# Patient Record
Sex: FEMALE | Race: BLACK OR AFRICAN AMERICAN | NOT HISPANIC OR LATINO
[De-identification: names, ages, dates, MRNs, and addresses within clinical notes are randomized per-mention and may not be internally consistent; named-entity substitution may affect disease eponyms.]

---

## 2017-09-26 ENCOUNTER — RESULT REVIEW (OUTPATIENT)
Age: 42
End: 2017-09-26

## 2017-10-05 ENCOUNTER — APPOINTMENT (OUTPATIENT)
Dept: UROLOGY | Facility: CLINIC | Age: 42
End: 2017-10-05
Payer: COMMERCIAL

## 2017-10-05 VITALS
HEIGHT: 63 IN | SYSTOLIC BLOOD PRESSURE: 110 MMHG | TEMPERATURE: 98.3 F | DIASTOLIC BLOOD PRESSURE: 80 MMHG | OXYGEN SATURATION: 95 % | HEART RATE: 78 BPM | WEIGHT: 157 LBS | BODY MASS INDEX: 27.82 KG/M2

## 2017-10-05 DIAGNOSIS — Z78.9 OTHER SPECIFIED HEALTH STATUS: ICD-10-CM

## 2017-10-05 PROCEDURE — 99203 OFFICE O/P NEW LOW 30 MIN: CPT

## 2017-10-05 RX ORDER — CABERGOLINE 0.5 MG/1
0.5 TABLET ORAL
Refills: 0 | Status: ACTIVE | COMMUNITY

## 2017-10-06 LAB
APPEARANCE: CLEAR
BACTERIA: NEGATIVE
BILIRUBIN URINE: NEGATIVE
BLOOD URINE: NEGATIVE
COLOR: ABNORMAL
CORE LAB FLUID CYTOLOGY: NORMAL
GLUCOSE QUALITATIVE U: NEGATIVE MG/DL
HYALINE CASTS: 2 /LPF
KETONES URINE: ABNORMAL
LEUKOCYTE ESTERASE URINE: NEGATIVE
MICROSCOPIC-UA: NORMAL
NITRITE URINE: NEGATIVE
PH URINE: 5.5
PROTEIN URINE: NEGATIVE MG/DL
RED BLOOD CELLS URINE: 2 /HPF
SPECIFIC GRAVITY URINE: 1.04
SQUAMOUS EPITHELIAL CELLS: 13 /HPF
UROBILINOGEN URINE: NEGATIVE MG/DL
WHITE BLOOD CELLS URINE: 2 /HPF

## 2017-10-09 LAB — BACTERIA UR CULT: NORMAL

## 2017-10-16 ENCOUNTER — APPOINTMENT (OUTPATIENT)
Dept: UROLOGY | Facility: CLINIC | Age: 42
End: 2017-10-16
Payer: COMMERCIAL

## 2017-10-23 ENCOUNTER — APPOINTMENT (OUTPATIENT)
Dept: UROLOGY | Facility: CLINIC | Age: 42
End: 2017-10-23
Payer: COMMERCIAL

## 2017-10-23 VITALS
SYSTOLIC BLOOD PRESSURE: 122 MMHG | DIASTOLIC BLOOD PRESSURE: 78 MMHG | TEMPERATURE: 98.2 F | OXYGEN SATURATION: 98 % | HEART RATE: 60 BPM

## 2017-10-23 DIAGNOSIS — R31.29 OTHER MICROSCOPIC HEMATURIA: ICD-10-CM

## 2017-10-23 PROCEDURE — 52000 CYSTOURETHROSCOPY: CPT

## 2018-07-25 PROBLEM — Z78.9 ALCOHOL USE: Status: ACTIVE | Noted: 2017-10-05

## 2019-02-20 ENCOUNTER — RESULT REVIEW (OUTPATIENT)
Age: 44
End: 2019-02-20

## 2019-06-12 ENCOUNTER — RESULT REVIEW (OUTPATIENT)
Age: 44
End: 2019-06-12

## 2021-01-14 ENCOUNTER — RESULT REVIEW (OUTPATIENT)
Age: 46
End: 2021-01-14

## 2021-06-28 ENCOUNTER — TRANSCRIPTION ENCOUNTER (OUTPATIENT)
Age: 46
End: 2021-06-28

## 2021-07-10 ENCOUNTER — EMERGENCY (EMERGENCY)
Facility: HOSPITAL | Age: 46
LOS: 1 days | End: 2021-07-10
Attending: EMERGENCY MEDICINE
Payer: COMMERCIAL

## 2021-07-10 VITALS
TEMPERATURE: 98 F | RESPIRATION RATE: 18 BRPM | DIASTOLIC BLOOD PRESSURE: 97 MMHG | HEART RATE: 94 BPM | WEIGHT: 167.99 LBS | OXYGEN SATURATION: 97 % | HEIGHT: 63 IN | SYSTOLIC BLOOD PRESSURE: 142 MMHG

## 2021-07-10 DIAGNOSIS — Z90.49 ACQUIRED ABSENCE OF OTHER SPECIFIED PARTS OF DIGESTIVE TRACT: Chronic | ICD-10-CM

## 2021-07-10 DIAGNOSIS — Z98.89 OTHER SPECIFIED POSTPROCEDURAL STATES: Chronic | ICD-10-CM

## 2021-07-10 LAB
ALBUMIN SERPL ELPH-MCNC: 4.2 G/DL — SIGNIFICANT CHANGE UP (ref 3.3–5)
ALP SERPL-CCNC: 63 U/L — SIGNIFICANT CHANGE UP (ref 40–120)
ALT FLD-CCNC: 13 U/L — SIGNIFICANT CHANGE UP (ref 10–45)
ANION GAP SERPL CALC-SCNC: 14 MMOL/L — SIGNIFICANT CHANGE UP (ref 5–17)
APPEARANCE UR: ABNORMAL
AST SERPL-CCNC: 17 U/L — SIGNIFICANT CHANGE UP (ref 10–40)
BACTERIA # UR AUTO: ABNORMAL
BILIRUB SERPL-MCNC: 0.1 MG/DL — LOW (ref 0.2–1.2)
BILIRUB UR-MCNC: NEGATIVE — SIGNIFICANT CHANGE UP
BUN SERPL-MCNC: 8 MG/DL — SIGNIFICANT CHANGE UP (ref 7–23)
CALCIUM SERPL-MCNC: 10.4 MG/DL — SIGNIFICANT CHANGE UP (ref 8.4–10.5)
CHLORIDE SERPL-SCNC: 104 MMOL/L — SIGNIFICANT CHANGE UP (ref 96–108)
CO2 SERPL-SCNC: 18 MMOL/L — LOW (ref 22–31)
COLOR SPEC: SIGNIFICANT CHANGE UP
CREAT SERPL-MCNC: 0.55 MG/DL — SIGNIFICANT CHANGE UP (ref 0.5–1.3)
DIFF PNL FLD: NEGATIVE — SIGNIFICANT CHANGE UP
EPI CELLS # UR: 18 /HPF — HIGH
GLUCOSE SERPL-MCNC: 103 MG/DL — HIGH (ref 70–99)
GLUCOSE UR QL: NEGATIVE — SIGNIFICANT CHANGE UP
HCT VFR BLD CALC: 38.5 % — SIGNIFICANT CHANGE UP (ref 34.5–45)
HGB BLD-MCNC: 13.2 G/DL — SIGNIFICANT CHANGE UP (ref 11.5–15.5)
HYALINE CASTS # UR AUTO: 4 /LPF — HIGH (ref 0–2)
KETONES UR-MCNC: NEGATIVE — SIGNIFICANT CHANGE UP
LEUKOCYTE ESTERASE UR-ACNC: NEGATIVE — SIGNIFICANT CHANGE UP
MCHC RBC-ENTMCNC: 32.2 PG — SIGNIFICANT CHANGE UP (ref 27–34)
MCHC RBC-ENTMCNC: 34.3 GM/DL — SIGNIFICANT CHANGE UP (ref 32–36)
MCV RBC AUTO: 93.9 FL — SIGNIFICANT CHANGE UP (ref 80–100)
NITRITE UR-MCNC: NEGATIVE — SIGNIFICANT CHANGE UP
NRBC # BLD: 0 /100 WBCS — SIGNIFICANT CHANGE UP (ref 0–0)
PH UR: 6.5 — SIGNIFICANT CHANGE UP (ref 5–8)
PLATELET # BLD AUTO: 255 K/UL — SIGNIFICANT CHANGE UP (ref 150–400)
POTASSIUM SERPL-MCNC: 4.1 MMOL/L — SIGNIFICANT CHANGE UP (ref 3.5–5.3)
POTASSIUM SERPL-SCNC: 4.1 MMOL/L — SIGNIFICANT CHANGE UP (ref 3.5–5.3)
PROT SERPL-MCNC: 7.8 G/DL — SIGNIFICANT CHANGE UP (ref 6–8.3)
PROT UR-MCNC: NEGATIVE — SIGNIFICANT CHANGE UP
RBC # BLD: 4.1 M/UL — SIGNIFICANT CHANGE UP (ref 3.8–5.2)
RBC # FLD: 12.9 % — SIGNIFICANT CHANGE UP (ref 10.3–14.5)
RBC CASTS # UR COMP ASSIST: 2 /HPF — SIGNIFICANT CHANGE UP (ref 0–4)
SODIUM SERPL-SCNC: 136 MMOL/L — SIGNIFICANT CHANGE UP (ref 135–145)
SP GR SPEC: 1.01 — SIGNIFICANT CHANGE UP (ref 1.01–1.02)
UROBILINOGEN FLD QL: NEGATIVE — SIGNIFICANT CHANGE UP
WBC # BLD: 7.43 K/UL — SIGNIFICANT CHANGE UP (ref 3.8–10.5)
WBC # FLD AUTO: 7.43 K/UL — SIGNIFICANT CHANGE UP (ref 3.8–10.5)
WBC UR QL: 2 /HPF — SIGNIFICANT CHANGE UP (ref 0–5)

## 2021-07-10 PROCEDURE — 80053 COMPREHEN METABOLIC PANEL: CPT

## 2021-07-10 PROCEDURE — 85027 COMPLETE CBC AUTOMATED: CPT

## 2021-07-10 PROCEDURE — 99284 EMERGENCY DEPT VISIT MOD MDM: CPT | Mod: 25

## 2021-07-10 PROCEDURE — 96375 TX/PRO/DX INJ NEW DRUG ADDON: CPT

## 2021-07-10 PROCEDURE — 81001 URINALYSIS AUTO W/SCOPE: CPT

## 2021-07-10 PROCEDURE — 99284 EMERGENCY DEPT VISIT MOD MDM: CPT

## 2021-07-10 PROCEDURE — 93005 ELECTROCARDIOGRAM TRACING: CPT

## 2021-07-10 PROCEDURE — 96374 THER/PROPH/DIAG INJ IV PUSH: CPT

## 2021-07-10 PROCEDURE — 93010 ELECTROCARDIOGRAM REPORT: CPT

## 2021-07-10 RX ORDER — ACETAMINOPHEN 500 MG
650 TABLET ORAL ONCE
Refills: 0 | Status: COMPLETED | OUTPATIENT
Start: 2021-07-10 | End: 2021-07-10

## 2021-07-10 RX ORDER — DIPHENHYDRAMINE HCL 50 MG
50 CAPSULE ORAL ONCE
Refills: 0 | Status: COMPLETED | OUTPATIENT
Start: 2021-07-10 | End: 2021-07-10

## 2021-07-10 RX ORDER — SODIUM CHLORIDE 9 MG/ML
1000 INJECTION INTRAMUSCULAR; INTRAVENOUS; SUBCUTANEOUS ONCE
Refills: 0 | Status: COMPLETED | OUTPATIENT
Start: 2021-07-10 | End: 2021-07-10

## 2021-07-10 RX ORDER — METOCLOPRAMIDE HCL 10 MG
10 TABLET ORAL ONCE
Refills: 0 | Status: COMPLETED | OUTPATIENT
Start: 2021-07-10 | End: 2021-07-10

## 2021-07-10 RX ADMIN — Medication 650 MILLIGRAM(S): at 22:47

## 2021-07-10 RX ADMIN — Medication 10 MILLIGRAM(S): at 22:48

## 2021-07-10 RX ADMIN — Medication 50 MILLIGRAM(S): at 22:48

## 2021-07-10 RX ADMIN — SODIUM CHLORIDE 1000 MILLILITER(S): 9 INJECTION INTRAMUSCULAR; INTRAVENOUS; SUBCUTANEOUS at 23:13

## 2021-07-10 NOTE — ED PROVIDER NOTE - NSFOLLOWUPINSTRUCTIONS_ED_ALL_ED_FT
Headache    A headache is pain or discomfort felt around the head or neck area. The specific cause of a headache may not be found as there are many types including tension headaches, migraine headaches, and cluster headaches. Watch your condition for any changes. Things you can do to manage your pain include taking over the counter and prescription medications as instructed by your health care provider, lying down in a dark quiet room, limiting stress, getting regular sleep, and refraining from alcohol and tobacco products.    SEEK IMMEDIATE MEDICAL CARE IF YOU HAVE ANY OF THE FOLLOWING SYMPTOMS: fever, vomiting, stiff neck, loss of vision, problems with speech, muscle weakness, loss of balance, trouble walking, passing out, or confusion.     Follow recommendations made by OB regarding hypertension medication.

## 2021-07-10 NOTE — ED PROVIDER NOTE - PROGRESS NOTE DETAILS
Spoke with OB, will come see the patient and discuss dose of hypertension medication.  Patient is feeling better after medication and fluids, no longer with headache. OB saw pt, discussed changing dose of hypertension medication. Pt is feeling better and would like to go home.

## 2021-07-10 NOTE — ED ADULT NURSE NOTE - OBJECTIVE STATEMENT
patient is a 46 year old female who is 13 weeks pregnant who presents to the ED from home complaining of ha x last night. patient states she went to [facility] MD today for HA and states "my pressure was high but after a few minutes it was lower when they checked it" patient states OB started her on nifedipine yesterday. patient took one of the pills this morning around 9am. patient states "pounding headache will not go away" patient also endorsing feet and hand swelling. upon assessment no swelling noted to hands or feet bilaterally. patient is aaox3, lungs CTA bilaterally, abd soft, nondistended, nontender, cap refill <3, radial pulses +2 bilaterally. patient denies chest pain, shortness of breath, weakness, numbness or tingling, fever, chills, cough, n/v/d, abd pain, back pain, changes in bowel or bladder, hematuria, bloody stool. patient resting on stretcher. MD at bedside to assess.

## 2021-07-10 NOTE — ED PROVIDER NOTE - PHYSICAL EXAMINATION
GENERAL: Awake. Alert. NAD. Well nourished.  HEENT: NC/AT, PERRL, EOMI, Conjunctiva pink, no scleral icterus. Airway patent. Mucous membranes dry.  LUNGS: CTAB. No wheezes or rales noted.  CARDIAC: Chest non-tender to palpation. RRR. S1 and S2 intact. No murmurs noted.  ABDOMEN: No masses noted. Normal BS. Soft, NT, ND, no rebound, no guarding. Uterus palpable to midway below umbilicus.  BACK: No midline spinal tenderness, no CVA tenderness  EXT: No edema, no calf tenderness, distal pulses 2+ bilaterally, no deformities.  NEURO: A&Ox3. Moving all extremities. Sensation and strength intact throughout. No focal deficits.  SKIN: Warm and dry. No rash.  PSYCH: Normal affect.

## 2021-07-10 NOTE — ED PROVIDER NOTE - PATIENT PORTAL LINK FT
You can access the FollowMyHealth Patient Portal offered by [facility] by registering at the following website: [email or web address]. By joining [employer]’s FollowMyHealth portal, you will also be able to view your health information using other applications (apps) compatible with our system.

## 2021-07-10 NOTE — ED ADULT TRIAGE NOTE - CHIEF COMPLAINT QUOTE
monaco x last PM, elevated BP at home, started on nifedipine yesterday. 13 weeks pregnant. denies visual changes.

## 2021-07-10 NOTE — ED PROVIDER NOTE - OBJECTIVE STATEMENT
46 y old f 13 weeks pregnant with ivf ,send from OB office with elevated /90 and constant headache ,no nausea ,vomiting ,blurred vision 46F 13 weeks pregnant with ivf, sent from OB office with elevated /90 and constant headache ,no nausea ,vomiting ,blurred vision. Headache is positional, gradual onset, non-exertional. Pain is 8/10, described as throbbing. Headache started last night after starting her first dose of nifedipine. Took acetaminophen at 9am with minimal improvement. Denies history of headaches prior. Denies fever, neck stiffness, blurred vision, chest pain, SOB.

## 2021-07-10 NOTE — ED PROVIDER NOTE - CLINICAL SUMMARY MEDICAL DECISION MAKING FREE TEXT BOX
46F 13 weeks pregnant with ivf, sent from OB office with elevated /90 and constant headache. Headache is positional, gradual onset, non-exertional. No history of headaches or hypertension prior to pregnancy.  Gestational hypertension vs dehydration vs tension headache  CBC, CMP, UA, EKG  Consult OB  F/u after results and consult  Dispo, likely discharge home with OB f/u 46F 13 weeks pregnant with ivf, sent from OB office with elevated /90 and constant headache. Headache is positional, gradual onset, non-exertional. No history of headaches or hypertension prior to pregnancy.  Gestational hypertension vs dehydration vs tension headache  CBC, CMP, UA, EKG  Consult OB  F/u after results and consult  Dispo, likely discharge home with OB f/u ZR

## 2021-07-11 VITALS
RESPIRATION RATE: 18 BRPM | OXYGEN SATURATION: 98 % | HEART RATE: 62 BPM | SYSTOLIC BLOOD PRESSURE: 130 MMHG | TEMPERATURE: 98 F | DIASTOLIC BLOOD PRESSURE: 87 MMHG

## 2021-07-11 NOTE — CHART NOTE - NSCHARTNOTEFT_GEN_A_CORE
R4 Eval Note    S: 47y/o  at 13w1d (IVF pregnancy) with cHTN, started on Procardia 30 XL two days ago, presenting with elevated BPs at home and headache. At time of evaluation in the ED patient reports HA has improved significantly. Denies OB complaints. Additional ROS negative.    Vital Signs Last 24 Hrs  T(C): 36.7 (2021 01:23), Max: 36.8 (10 Jul 2021 19:02)  T(F): 98 (:23), Max: 98.3 (10 Jul 2021 20:09)  HR: 62 (:) (62 - 94)  BP: 130/87 (:) (130/87 - 152/95)  BP(mean): --  RR: 18 (:23) (17 - 18)  SpO2: 98% (:) (97% - 98%)    Gen: NAD    FH check performed by ED: 160s    A/P: 47y/o  at 13w1d (IVF pregnancy) with cHTN, started on Procardia 30 XL two days ago, presenting with elevated BPs at home and headache resolved with medications. BPs mild range. Labs wnl. No concern for preeclampsia at this gestational age.   - Discharge home with return precautions   - Cardio OB email to be sent for outpatient follow up  - Will adjust meds to Procardia 60 QD  - BP check with OB this coming week    SHERRY Luis PGY4  Patient seen and counseled with Dr. Reyna R4 Consult Note    S: 47y/o  at 13w1d (IVF pregnancy) with cHTN, started on Procardia 30 XL two days ago, presenting with elevated BPs at home and headache. At time of evaluation in the ED patient reports HA has improved significantly. Denies OB complaints. Additional ROS negative.    Vital Signs Last 24 Hrs  T(C): 36.7 (2021 01:23), Max: 36.8 (10 Jul 2021 19:02)  T(F): 98 (:23), Max: 98.3 (10 Jul 2021 20:09)  HR: 62 (:) (62 - 94)  BP: 130/87 (:23) (130/87 - 152/95)  BP(mean): --  RR: 18 (:23) (17 - 18)  SpO2: 98% (:) (97% - 98%)    Gen: NAD    FH check performed by ED: 160s    A/P: 47y/o  at 13w1d (IVF pregnancy) with cHTN, started on Procardia 30 XL two days ago, presenting with elevated BPs at home and headache resolved with medications. BPs mild range. Labs wnl. No concern for preeclampsia at this gestational age.   - Discharge home with return precautions   - Cardio OB email to be sent for outpatient follow up  - Will adjust meds to Procardia 60 QD  - BP check with OB this coming week    SHERRY Luis PGY4  Patient seen and counseled with Dr. Reyna R4 Consult Note    S: 47y/o  at 13w1d (IVF pregnancy) with cHTN, started on Procardia 30 XL two days ago, presenting with elevated BPs at home and headache. At time of evaluation in the ED patient reports HA has improved significantly. Denies OB complaints. Additional ROS negative.    Vital Signs Last 24 Hrs  T(C): 36.7 (2021 01:23), Max: 36.8 (10 Jul 2021 19:02)  T(F): 98 (:23), Max: 98.3 (10 Jul 2021 20:09)  HR: 62 (:23) (62 - 94)  BP: 130/87 (:23) (130/87 - 152/95)  BP(mean): --  RR: 18 (:23) (17 - 18)  SpO2: 98% (:) (97% - 98%)    Gen: NAD    FH check performed by ED: 160s    A/P: 47y/o  at 13w1d (IVF pregnancy) with cHTN, started on Procardia 30 XL two days ago, presenting with elevated BPs at home and headache resolved with medications. BPs mild range. Labs wnl. No concern for preeclampsia at this gestational age.   - Discharge home with return precautions   - Cardio OB email to be sent for outpatient follow up  - Will adjust meds to Procardia 60 QD  - BP check with OB this coming week    SHERRY Luis PGY4  Patient seen and counseled with Dr. Reyna    OB attending addendum  pt brought herself to ER b/c of headache - resovled at this time  will increase procardia to 60 and will consult with cardio OB  precautions given   pt with apt in office this week

## 2021-07-20 ENCOUNTER — APPOINTMENT (OUTPATIENT)
Dept: CARDIOLOGY | Facility: CLINIC | Age: 46
End: 2021-07-20
Payer: COMMERCIAL

## 2021-07-20 DIAGNOSIS — Z82.49 FAMILY HISTORY OF ISCHEMIC HEART DISEASE AND OTHER DISEASES OF THE CIRCULATORY SYSTEM: ICD-10-CM

## 2021-07-20 DIAGNOSIS — Z86.018 PERSONAL HISTORY OF OTHER BENIGN NEOPLASM: ICD-10-CM

## 2021-07-20 DIAGNOSIS — Z83.3 FAMILY HISTORY OF DIABETES MELLITUS: ICD-10-CM

## 2021-07-20 DIAGNOSIS — Z87.42 PERSONAL HISTORY OF OTHER DISEASES OF THE FEMALE GENITAL TRACT: ICD-10-CM

## 2021-07-20 PROCEDURE — 99214 OFFICE O/P EST MOD 30 MIN: CPT | Mod: 95

## 2021-07-25 PROBLEM — Z82.49 FAMILY HISTORY OF HYPERTENSION: Status: ACTIVE | Noted: 2021-07-25

## 2021-07-25 PROBLEM — Z87.42 HISTORY OF ENDOMETRIOSIS: Status: RESOLVED | Noted: 2021-07-25 | Resolved: 2021-07-25

## 2021-07-25 PROBLEM — Z86.018 HISTORY OF UTERINE LEIOMYOMA: Status: RESOLVED | Noted: 2021-07-25 | Resolved: 2021-07-25

## 2021-07-25 PROBLEM — Z83.3 FAMILY HISTORY OF DIABETES MELLITUS: Status: ACTIVE | Noted: 2021-07-25

## 2021-07-25 NOTE — HISTORY OF PRESENT ILLNESS
[Home] : at home, [unfilled] , at the time of the visit. [Other Location: e.g. Home (Enter Location, City,State)___] : at [unfilled] [FreeTextEntry1] : 46 year old female with strong family history of hypertension and diabetes.\par \par She carries a personal history of infertility (9 years ), endometriosis, pituitary tumor (excised in 2010) , myomectomy secondary to fibroids and chronic hypertension.complicating a current pregnancy ( via IVF due to advanced maternal age: donor egg) in which she is 14 weeks gestation. \par \par DUE DATE: January 16, 2022\par \par Patient carries a pregnancy history as follows:\par \par 1st pregnancy\par IVF transfer: ectopic pregnancy\par \par 2nd pregnancy\par IUI - miscarriage, chemical pregnancy\par \par 3rd pregnancy\par non viable\par \par 4th- current pregnancy \par IVF transfer with donor egg\par \par Treated with Procardia ER 30 mg QD\par She was seen recently in the ER completed COVID vaccination: Moderna\par \par Denies any issues with shortness of breath, palpitations or chest discomfort. \par \par

## 2021-07-25 NOTE — ASSESSMENT
[FreeTextEntry1] : 46 year old female with a history of endometriosis, pituitary tumor (excised in 2010) , myomectomy secondary to fibroids and chronic hypertension.complicating a current pregnancy ( via IVF due to advanced maternal age: donor egg) in which she is 14 weeks gestation. \par \par #Chronic Hypertension\par   Reported blood pressure in good control\par   Continue on Procardia ER 60 mg QD\par   Continue home BP montoring\par   Notify if BP rises above 140 /90\par \par # Adverse Cardiovascular risk factors\par   Will follow blood pressure closely throughout pregnancy\par   Recommending echocardiogram to assess cardiovascular structure and function\par \par   Post partum, will recommend a more comprehensive cardiovascular screening with exercise stress testing and full blood work panel .\par \par  Follow up in one month, unless BP becomes in poor control.\par \par \par \par \par

## 2021-07-26 ENCOUNTER — ASOB RESULT (OUTPATIENT)
Age: 46
End: 2021-07-26

## 2021-07-26 ENCOUNTER — APPOINTMENT (OUTPATIENT)
Dept: MATERNAL FETAL MEDICINE | Facility: CLINIC | Age: 46
End: 2021-07-26
Payer: COMMERCIAL

## 2021-07-26 PROCEDURE — 99203 OFFICE O/P NEW LOW 30 MIN: CPT | Mod: 25,95

## 2021-08-13 ENCOUNTER — APPOINTMENT (OUTPATIENT)
Dept: CARDIOLOGY | Facility: CLINIC | Age: 46
End: 2021-08-13
Payer: COMMERCIAL

## 2021-08-13 DIAGNOSIS — R01.1 CARDIAC MURMUR, UNSPECIFIED: ICD-10-CM

## 2021-08-13 PROCEDURE — 93306 TTE W/DOPPLER COMPLETE: CPT

## 2021-08-19 ENCOUNTER — NON-APPOINTMENT (OUTPATIENT)
Age: 46
End: 2021-08-19

## 2021-08-19 ENCOUNTER — APPOINTMENT (OUTPATIENT)
Dept: CARDIOLOGY | Facility: CLINIC | Age: 46
End: 2021-08-19
Payer: COMMERCIAL

## 2021-08-19 VITALS
WEIGHT: 170 LBS | SYSTOLIC BLOOD PRESSURE: 117 MMHG | HEART RATE: 84 BPM | DIASTOLIC BLOOD PRESSURE: 82 MMHG | OXYGEN SATURATION: 100 % | BODY MASS INDEX: 30.12 KG/M2 | HEIGHT: 63 IN

## 2021-08-19 DIAGNOSIS — O10.919 UNSPECIFIED PRE-EXISTING HYPERTENSION COMPLICATING PREGNANCY, UNSPECIFIED TRIMESTER: ICD-10-CM

## 2021-08-19 PROCEDURE — 93000 ELECTROCARDIOGRAM COMPLETE: CPT

## 2021-08-19 PROCEDURE — 99214 OFFICE O/P EST MOD 30 MIN: CPT

## 2021-08-19 NOTE — DISCUSSION/SUMMARY
[FreeTextEntry1] : 46 year old female with strong family history of hypertension and diabetes.\par She carries a personal history of infertility (9 years ), endometriosis, pituitary tumor (excised in 2010) , myomectomy secondary to fibroids and chronic hypertension.complicating a current pregnancy ( via IVF due to advanced maternal age: donor egg) in which she is 14 weeks gestation. \par DUE DATE: January 16, 2022\par \par 1st pregnancy :IVF transfer: ectopic pregnancy\par 2nd pregnancy : IUI - miscarriage, chemical pregnancy\par 3rd pregnancy : non viable\par 4th- current pregnancy \par IVF transfer with donor egg\par \par BP log 99- 121/66- 84\par cHTN affecting pregnancy - \par BP Stable \par Encouraged Patient to monitor BP at home and keep a log and report results back to us for evaluation. Based on results, we will adjust medications as necessary. \par Additionally, encouraged heart healthy diet and exercise as tolerated.\par EKG with no acute changes.\par Advised to take  Procardia XL 30 mg bid and hold if BP <110/ 70 since she has been having systolic BP in the 90's

## 2021-08-19 NOTE — PHYSICAL EXAM
[Well Developed] : well developed [Well Nourished] : well nourished [No Acute Distress] : no acute distress [Normal Conjunctiva] : normal conjunctiva [No Carotid Bruit] : no carotid bruit [Normal Venous Pressure] : normal venous pressure [Normal S1, S2] : normal S1, S2 [No Murmur] : no murmur [No Rub] : no rub [No Gallop] : no gallop [Clear Lung Fields] : clear lung fields [Good Air Entry] : good air entry [No Respiratory Distress] : no respiratory distress  [Soft] : abdomen soft [Non Tender] : non-tender [No Masses/organomegaly] : no masses/organomegaly [Normal Bowel Sounds] : normal bowel sounds [No Edema] : no edema [Normal Gait] : normal gait [No Cyanosis] : no cyanosis [No Clubbing] : no clubbing [No Varicosities] : no varicosities [No Rash] : no rash [No Skin Lesions] : no skin lesions [Moves all extremities] : moves all extremities [No Focal Deficits] : no focal deficits [Normal Speech] : normal speech [Alert and Oriented] : alert and oriented [Normal memory] : normal memory

## 2021-08-20 DIAGNOSIS — Z78.9 OTHER SPECIFIED HEALTH STATUS: ICD-10-CM

## 2021-08-30 ENCOUNTER — APPOINTMENT (OUTPATIENT)
Dept: ANTEPARTUM | Facility: CLINIC | Age: 46
End: 2021-08-30
Payer: COMMERCIAL

## 2021-08-30 ENCOUNTER — APPOINTMENT (OUTPATIENT)
Dept: MATERNAL FETAL MEDICINE | Facility: CLINIC | Age: 46
End: 2021-08-30
Payer: COMMERCIAL

## 2021-08-30 ENCOUNTER — ASOB RESULT (OUTPATIENT)
Age: 46
End: 2021-08-30

## 2021-08-30 VITALS
SYSTOLIC BLOOD PRESSURE: 122 MMHG | RESPIRATION RATE: 18 BRPM | OXYGEN SATURATION: 98 % | HEART RATE: 101 BPM | WEIGHT: 169.5 LBS | BODY MASS INDEX: 30.03 KG/M2 | HEIGHT: 63 IN | DIASTOLIC BLOOD PRESSURE: 84 MMHG

## 2021-08-30 PROCEDURE — 76817 TRANSVAGINAL US OBSTETRIC: CPT

## 2021-08-30 PROCEDURE — 76811 OB US DETAILED SNGL FETUS: CPT

## 2021-08-30 PROCEDURE — 99215 OFFICE O/P EST HI 40 MIN: CPT

## 2021-09-14 PROBLEM — R01.1 MURMUR: Status: ACTIVE | Noted: 2021-09-14

## 2021-09-15 ENCOUNTER — NON-APPOINTMENT (OUTPATIENT)
Age: 46
End: 2021-09-15

## 2021-09-15 DIAGNOSIS — R42 DIZZINESS AND GIDDINESS: ICD-10-CM

## 2021-09-15 PROCEDURE — 99442: CPT

## 2021-09-16 ENCOUNTER — APPOINTMENT (OUTPATIENT)
Dept: PEDIATRIC CARDIOLOGY | Facility: CLINIC | Age: 46
End: 2021-09-16
Payer: COMMERCIAL

## 2021-09-16 PROCEDURE — 76821 MIDDLE CEREBRAL ARTERY ECHO: CPT

## 2021-09-16 PROCEDURE — 76825 ECHO EXAM OF FETAL HEART: CPT

## 2021-09-16 PROCEDURE — 76820 UMBILICAL ARTERY ECHO: CPT

## 2021-09-16 PROCEDURE — 99213 OFFICE O/P EST LOW 20 MIN: CPT | Mod: 25

## 2021-09-16 PROCEDURE — 93325 DOPPLER ECHO COLOR FLOW MAPG: CPT | Mod: 59

## 2021-09-16 PROCEDURE — 76827 ECHO EXAM OF FETAL HEART: CPT

## 2021-09-29 ENCOUNTER — ASOB RESULT (OUTPATIENT)
Age: 46
End: 2021-09-29

## 2021-09-29 ENCOUNTER — APPOINTMENT (OUTPATIENT)
Dept: ANTEPARTUM | Facility: CLINIC | Age: 46
End: 2021-09-29
Payer: COMMERCIAL

## 2021-09-29 PROCEDURE — 76816 OB US FOLLOW-UP PER FETUS: CPT

## 2021-09-29 PROCEDURE — 76820 UMBILICAL ARTERY ECHO: CPT

## 2021-10-13 ENCOUNTER — APPOINTMENT (OUTPATIENT)
Dept: CARDIOLOGY | Facility: CLINIC | Age: 46
End: 2021-10-13
Payer: COMMERCIAL

## 2021-10-13 ENCOUNTER — NON-APPOINTMENT (OUTPATIENT)
Age: 46
End: 2021-10-13

## 2021-10-13 VITALS
SYSTOLIC BLOOD PRESSURE: 120 MMHG | BODY MASS INDEX: 30.65 KG/M2 | HEART RATE: 90 BPM | WEIGHT: 173 LBS | DIASTOLIC BLOOD PRESSURE: 81 MMHG | OXYGEN SATURATION: 98 % | HEIGHT: 63 IN

## 2021-10-13 PROCEDURE — 93000 ELECTROCARDIOGRAM COMPLETE: CPT

## 2021-10-13 PROCEDURE — 99214 OFFICE O/P EST MOD 30 MIN: CPT

## 2021-10-13 NOTE — HISTORY OF PRESENT ILLNESS
[FreeTextEntry1] : 46 year old  with strong family history of hypertension and diabetes.\par She carries a personal history of infertility (9 years ), endometriosis, pituitary tumor (excised in 2010) , myomectomy secondary to fibroids and chronic hypertension.complicating a current pregnancy ( via IVF due to advanced maternal age: donor egg) currently 26 weeks GA \par DUE DATE: January 16, 2022\par \par 1st pregnancy :IVF transfer: ectopic pregnancy\par 2nd pregnancy : IUI - miscarriage, chemical pregnancy\par 3rd pregnancy : non viable\par 4th- current pregnancy \par IVF transfer with donor egg\par \par \par cHTN affecting pregnancy - \par BP Stable 120/80's \par Encouraged Patient to monitor BP at home and keep a log and report results back to us for evaluation. Based on results, we will adjust medications as necessary. \par Additionally, encouraged heart healthy diet and exercise as tolerated.\par EKG with no acute changes.\par Continue Procardia XL 60 mg QD

## 2021-10-13 NOTE — DISCUSSION/SUMMARY
[FreeTextEntry1] : 46 year old female with strong family history of hypertension and diabetes.\par She carries a personal history of infertility (9 years ), endometriosis, pituitary tumor (excised in 2010) , myomectomy secondary to fibroids and chronic hypertension.complicating a current pregnancy ( via IVF due to advanced maternal age: donor egg) in which she is 14 weeks gestation. \par DUE DATE: January 16, 2022\par \par 1st pregnancy :IVF transfer: ectopic pregnancy\par 2nd pregnancy : IUI - miscarriage, chemical pregnancy\par 3rd pregnancy : non viable\par 4th- current pregnancy \par IVF transfer with donor egg\par \par BP log  : \par cHTN affecting pregnancy - \par BP Stable 120/80's \par Encouraged Patient to monitor BP at home and keep a log and report results back to us for evaluation. Based on results, we will adjust medications as necessary. \par Additionally, encouraged heart healthy diet and exercise as tolerated.\par EKG with no acute changes.\par Continue Procardia XL 60 mg QD  \par \par 2) Edema - Encouraged to be cautious with salt intake \par Stockings \par Sneakers \par continue to monitor BP at home and notify if BP increases or any c/o CO, SOB, dizziness, LH,.

## 2021-10-13 NOTE — CARDIOLOGY SUMMARY
[de-identified] : 10/13/21 SR 80's  [de-identified] : 8/13/21 - No segmental wall motion abnormality

## 2021-10-25 ENCOUNTER — ASOB RESULT (OUTPATIENT)
Age: 46
End: 2021-10-25

## 2021-10-25 ENCOUNTER — APPOINTMENT (OUTPATIENT)
Dept: MATERNAL FETAL MEDICINE | Facility: CLINIC | Age: 46
End: 2021-10-25
Payer: COMMERCIAL

## 2021-10-25 ENCOUNTER — APPOINTMENT (OUTPATIENT)
Dept: ANTEPARTUM | Facility: CLINIC | Age: 46
End: 2021-10-25
Payer: COMMERCIAL

## 2021-10-25 ENCOUNTER — OUTPATIENT (OUTPATIENT)
Dept: OUTPATIENT SERVICES | Facility: HOSPITAL | Age: 46
LOS: 1 days | End: 2021-10-25
Payer: COMMERCIAL

## 2021-10-25 ENCOUNTER — APPOINTMENT (OUTPATIENT)
Dept: ANTEPARTUM | Facility: CLINIC | Age: 46
End: 2021-10-25

## 2021-10-25 VITALS — TEMPERATURE: 98 F | HEART RATE: 88 BPM | SYSTOLIC BLOOD PRESSURE: 126 MMHG | DIASTOLIC BLOOD PRESSURE: 87 MMHG

## 2021-10-25 VITALS
OXYGEN SATURATION: 98 % | DIASTOLIC BLOOD PRESSURE: 84 MMHG | HEART RATE: 71 BPM | RESPIRATION RATE: 18 BRPM | SYSTOLIC BLOOD PRESSURE: 133 MMHG | TEMPERATURE: 98 F

## 2021-10-25 VITALS
RESPIRATION RATE: 18 BRPM | HEIGHT: 63 IN | BODY MASS INDEX: 31.18 KG/M2 | SYSTOLIC BLOOD PRESSURE: 120 MMHG | WEIGHT: 176 LBS | HEART RATE: 90 BPM | OXYGEN SATURATION: 98 % | DIASTOLIC BLOOD PRESSURE: 80 MMHG

## 2021-10-25 DIAGNOSIS — O26.899 OTHER SPECIFIED PREGNANCY RELATED CONDITIONS, UNSPECIFIED TRIMESTER: ICD-10-CM

## 2021-10-25 DIAGNOSIS — Z98.89 OTHER SPECIFIED POSTPROCEDURAL STATES: Chronic | ICD-10-CM

## 2021-10-25 DIAGNOSIS — Z3A.00 WEEKS OF GESTATION OF PREGNANCY NOT SPECIFIED: ICD-10-CM

## 2021-10-25 DIAGNOSIS — Z90.49 ACQUIRED ABSENCE OF OTHER SPECIFIED PARTS OF DIGESTIVE TRACT: Chronic | ICD-10-CM

## 2021-10-25 PROCEDURE — 76816 OB US FOLLOW-UP PER FETUS: CPT

## 2021-10-25 PROCEDURE — G0463: CPT

## 2021-10-25 PROCEDURE — 99215 OFFICE O/P EST HI 40 MIN: CPT | Mod: 25

## 2021-10-25 PROCEDURE — 59025 FETAL NON-STRESS TEST: CPT

## 2021-10-25 NOTE — OB PROVIDER TRIAGE NOTE - NSOBPROVIDERNOTE_OBGYN_ALL_OB_FT
45yo  at 28 2/ presenting with left pulling pain and contractions seen in ATU. Mild irritability on toco today, no evidence of PTL otherwise    - discharge home  - return precautions    SHERRY Pritchett, PGY-3  d/w Dr. Yancey

## 2021-10-25 NOTE — OB PROVIDER TRIAGE NOTE - NSHPPHYSICALEXAM_GEN_ALL_CORE
Vital Signs Last 24 Hrs  T(C): 36.6 (25 Oct 2021 19:23), Max: 36.6 (25 Oct 2021 18:03)  T(F): 97.88 (25 Oct 2021 19:23), Max: 97.9 (25 Oct 2021 18:25)  HR: 71 (25 Oct 2021 19:23) (71 - 92)  BP: 133/84 (25 Oct 2021 19:23) (126/87 - 134/79)  BP(mean): --  RR: 18 (25 Oct 2021 19:23) (16 - 18)  SpO2: 98% (25 Oct 2021 19:23) (95% - 99%)\    Gen: NAD  Resp: unlabored on RA  CV: RR  GI: soft, nontender, gravid    FHT: 140, mod, + accels, - decels  [place]: no CTX  SSE: cervix not visualized; physiologic discharge, no bleeding  SVE: cervix not palpable 2/2 large fibroid    CL: unable to see 2/2 large obstruction fibroid  BPP: 8/8, posterior, transverse, 9.6cm anterior MOLLY fibroid

## 2021-10-25 NOTE — OB PROVIDER TRIAGE NOTE - HISTORY OF PRESENT ILLNESS
45yo  at 28  sent in from ATU for concerns for contractions on NST. Pt also reporting left sided pulling pain and bladder pressure. Pt has a known 10cm anterior MOLLY fibroid. Pt states pain is 2/10 and resolves with Tylenol. Regarding large fibroid, patient took 1x dose of Indocin in July but hasn't continued the medication. She has to use the restroom every 10 minutes due to the pressure on the bladder. Denies VB, LOF, +GFM    PNC: Donor egg IVF  AMA  Fibroids, s/p myomectomy   cHTN diagnosed early this pregnancy    ATU (10/25): transverse, posterior w/ marginal cord insertion, AFV wnl, Anterior MOLLY Fibroid 9.6 x 8 x 8.9 cm; CL not measurable 2/2 large fibroid      OBHx:  ectopic s/p failed MTX and Left Salpingectomy (ruptured)  GynHx: +Fibroids s/p Laparoscopic Myomectomy ; denies fibroids, cysts, abnormal pap smears, STIs  PMH: cHTN (diagnosed early this pregnancy)  PSH: ectopic, myomectomy  Social: denies anxiety/depression  Meds: PNV, ASA, Procardia 60XL  All: NKDA

## 2021-10-25 NOTE — OB RN TRIAGE NOTE - NS_OBGYNHISTORY_OBGYN_ALL_OB_FT
ectopic pregnancy with left salpingectomy 2014  chemical pregnancy 2016  uterine fibroids  endometriosis  current IVF pregnancy  current gestational hypertension on nifedipine PO daily

## 2021-11-02 ENCOUNTER — APPOINTMENT (OUTPATIENT)
Dept: ANTEPARTUM | Facility: CLINIC | Age: 46
End: 2021-11-02

## 2021-11-10 ENCOUNTER — ASOB RESULT (OUTPATIENT)
Age: 46
End: 2021-11-10

## 2021-11-10 ENCOUNTER — APPOINTMENT (OUTPATIENT)
Dept: ANTEPARTUM | Facility: CLINIC | Age: 46
End: 2021-11-10
Payer: COMMERCIAL

## 2021-11-10 ENCOUNTER — APPOINTMENT (OUTPATIENT)
Dept: MATERNAL FETAL MEDICINE | Facility: CLINIC | Age: 46
End: 2021-11-10
Payer: COMMERCIAL

## 2021-11-10 VITALS
BODY MASS INDEX: 30.54 KG/M2 | HEART RATE: 80 BPM | WEIGHT: 172.38 LBS | SYSTOLIC BLOOD PRESSURE: 122 MMHG | DIASTOLIC BLOOD PRESSURE: 84 MMHG | HEIGHT: 63 IN | OXYGEN SATURATION: 99 %

## 2021-11-10 PROCEDURE — 76816 OB US FOLLOW-UP PER FETUS: CPT

## 2021-11-10 PROCEDURE — 99213 OFFICE O/P EST LOW 20 MIN: CPT

## 2021-12-09 ENCOUNTER — APPOINTMENT (OUTPATIENT)
Dept: ANTEPARTUM | Facility: CLINIC | Age: 46
End: 2021-12-09

## 2021-12-09 ENCOUNTER — APPOINTMENT (OUTPATIENT)
Dept: ANTEPARTUM | Facility: CLINIC | Age: 46
End: 2021-12-09
Payer: COMMERCIAL

## 2021-12-09 ENCOUNTER — APPOINTMENT (OUTPATIENT)
Dept: MATERNAL FETAL MEDICINE | Facility: CLINIC | Age: 46
End: 2021-12-09
Payer: COMMERCIAL

## 2021-12-09 ENCOUNTER — ASOB RESULT (OUTPATIENT)
Age: 46
End: 2021-12-09

## 2021-12-09 VITALS
WEIGHT: 176 LBS | SYSTOLIC BLOOD PRESSURE: 134 MMHG | HEIGHT: 63 IN | HEART RATE: 64 BPM | BODY MASS INDEX: 31.18 KG/M2 | DIASTOLIC BLOOD PRESSURE: 84 MMHG

## 2021-12-09 PROCEDURE — 76819 FETAL BIOPHYS PROFIL W/O NST: CPT

## 2021-12-09 PROCEDURE — 76816 OB US FOLLOW-UP PER FETUS: CPT

## 2021-12-09 PROCEDURE — 99213 OFFICE O/P EST LOW 20 MIN: CPT

## 2021-12-14 ENCOUNTER — OUTPATIENT (OUTPATIENT)
Dept: OUTPATIENT SERVICES | Facility: HOSPITAL | Age: 46
LOS: 1 days | End: 2021-12-14
Payer: COMMERCIAL

## 2021-12-14 VITALS
DIASTOLIC BLOOD PRESSURE: 78 MMHG | HEIGHT: 63 IN | HEART RATE: 80 BPM | TEMPERATURE: 98 F | OXYGEN SATURATION: 98 % | RESPIRATION RATE: 16 BRPM | SYSTOLIC BLOOD PRESSURE: 113 MMHG | WEIGHT: 175.05 LBS

## 2021-12-14 DIAGNOSIS — Z98.89 OTHER SPECIFIED POSTPROCEDURAL STATES: Chronic | ICD-10-CM

## 2021-12-14 DIAGNOSIS — O34.519: ICD-10-CM

## 2021-12-14 DIAGNOSIS — Z01.818 ENCOUNTER FOR OTHER PREPROCEDURAL EXAMINATION: ICD-10-CM

## 2021-12-14 DIAGNOSIS — O09.512 SUPERVISION OF ELDERLY PRIMIGRAVIDA, SECOND TRIMESTER: ICD-10-CM

## 2021-12-14 DIAGNOSIS — Z90.49 ACQUIRED ABSENCE OF OTHER SPECIFIED PARTS OF DIGESTIVE TRACT: Chronic | ICD-10-CM

## 2021-12-14 LAB
ANION GAP SERPL CALC-SCNC: 13 MMOL/L — SIGNIFICANT CHANGE UP (ref 5–17)
BLD GP AB SCN SERPL QL: NEGATIVE — SIGNIFICANT CHANGE UP
BUN SERPL-MCNC: 13 MG/DL — SIGNIFICANT CHANGE UP (ref 7–23)
CALCIUM SERPL-MCNC: 9.5 MG/DL — SIGNIFICANT CHANGE UP (ref 8.4–10.5)
CHLORIDE SERPL-SCNC: 103 MMOL/L — SIGNIFICANT CHANGE UP (ref 96–108)
CO2 SERPL-SCNC: 18 MMOL/L — LOW (ref 22–31)
CREAT SERPL-MCNC: 0.96 MG/DL — SIGNIFICANT CHANGE UP (ref 0.5–1.3)
GLUCOSE SERPL-MCNC: 81 MG/DL — SIGNIFICANT CHANGE UP (ref 70–99)
HCT VFR BLD CALC: 35.7 % — SIGNIFICANT CHANGE UP (ref 34.5–45)
HGB BLD-MCNC: 12.1 G/DL — SIGNIFICANT CHANGE UP (ref 11.5–15.5)
MCHC RBC-ENTMCNC: 33.4 PG — SIGNIFICANT CHANGE UP (ref 27–34)
MCHC RBC-ENTMCNC: 33.9 GM/DL — SIGNIFICANT CHANGE UP (ref 32–36)
MCV RBC AUTO: 98.6 FL — SIGNIFICANT CHANGE UP (ref 80–100)
NRBC # BLD: 0 /100 WBCS — SIGNIFICANT CHANGE UP (ref 0–0)
PLATELET # BLD AUTO: 252 K/UL — SIGNIFICANT CHANGE UP (ref 150–400)
POTASSIUM SERPL-MCNC: 4.2 MMOL/L — SIGNIFICANT CHANGE UP (ref 3.5–5.3)
POTASSIUM SERPL-SCNC: 4.2 MMOL/L — SIGNIFICANT CHANGE UP (ref 3.5–5.3)
RBC # BLD: 3.62 M/UL — LOW (ref 3.8–5.2)
RBC # FLD: 13.1 % — SIGNIFICANT CHANGE UP (ref 10.3–14.5)
RH IG SCN BLD-IMP: POSITIVE — SIGNIFICANT CHANGE UP
SODIUM SERPL-SCNC: 134 MMOL/L — LOW (ref 135–145)
WBC # BLD: 7.9 K/UL — SIGNIFICANT CHANGE UP (ref 3.8–10.5)
WBC # FLD AUTO: 7.9 K/UL — SIGNIFICANT CHANGE UP (ref 3.8–10.5)

## 2021-12-14 PROCEDURE — G0463: CPT

## 2021-12-14 PROCEDURE — 86850 RBC ANTIBODY SCREEN: CPT

## 2021-12-14 PROCEDURE — 85027 COMPLETE CBC AUTOMATED: CPT

## 2021-12-14 PROCEDURE — 86901 BLOOD TYPING SEROLOGIC RH(D): CPT

## 2021-12-14 PROCEDURE — 86900 BLOOD TYPING SEROLOGIC ABO: CPT

## 2021-12-14 PROCEDURE — 80048 BASIC METABOLIC PNL TOTAL CA: CPT

## 2021-12-14 RX ORDER — NIFEDIPINE 30 MG
1 TABLET, EXTENDED RELEASE 24 HR ORAL
Qty: 0 | Refills: 0 | DISCHARGE

## 2021-12-14 RX ORDER — SODIUM CHLORIDE 9 MG/ML
1000 INJECTION, SOLUTION INTRAVENOUS
Refills: 0 | Status: DISCONTINUED | OUTPATIENT
Start: 2021-12-28 | End: 2021-12-28

## 2021-12-14 RX ORDER — CEFAZOLIN SODIUM 1 G
2000 VIAL (EA) INJECTION ONCE
Refills: 0 | Status: COMPLETED | OUTPATIENT
Start: 2021-12-28 | End: 2021-12-28

## 2021-12-14 RX ORDER — OXYTOCIN 10 UNIT/ML
333.33 VIAL (ML) INJECTION
Qty: 20 | Refills: 0 | Status: COMPLETED | OUTPATIENT
Start: 2021-12-28 | End: 2021-12-28

## 2021-12-14 RX ORDER — SODIUM CHLORIDE 9 MG/ML
1000 INJECTION, SOLUTION INTRAVENOUS ONCE
Refills: 0 | Status: COMPLETED | OUTPATIENT
Start: 2021-12-28 | End: 2021-12-28

## 2021-12-14 NOTE — OB PST NOTE - FALL HARM RISK - UNIVERSAL INTERVENTIONS
Bed in lowest position, wheels locked, appropriate side rails in place/Call bell, personal items and telephone in reach/Instruct patient to call for assistance before getting out of bed or chair/Non-slip footwear when patient is out of bed/[place] to call system/Physically safe environment - no spills, clutter or unnecessary equipment/Purposeful Proactive Rounding/Room/bathroom lighting operational, light cord in reach

## 2021-12-14 NOTE — OB PST NOTE - NSICDXPASTSURGICALHX_GEN_ALL_CORE_FT
PAST SURGICAL HISTORY:  History of surgical removal of pituitary gland 2010    S/P appendectomy at age 10    S/P laparoscopy for endometriosis    Uterine fibroid

## 2021-12-14 NOTE — OB PST NOTE - HISTORY OF PRESENT ILLNESS
47 y/o female  35w 3d gestation  with h/o gestational HTN, presents to PST for scheduled  on 21. Denies any palpitations, SOB, N/V, fever or chills.   ***COVID swab scheduled for 21***

## 2021-12-17 PROBLEM — O13.9 GESTATIONAL [PREGNANCY-INDUCED] HYPERTENSION WITHOUT SIGNIFICANT PROTEINURIA, UNSPECIFIED TRIMESTER: Chronic | Status: ACTIVE | Noted: 2021-12-14

## 2021-12-17 PROBLEM — N80.9 ENDOMETRIOSIS, UNSPECIFIED: Chronic | Status: ACTIVE | Noted: 2021-12-14

## 2021-12-17 PROBLEM — D35.2 BENIGN NEOPLASM OF PITUITARY GLAND: Chronic | Status: ACTIVE | Noted: 2021-12-14

## 2021-12-19 ENCOUNTER — OUTPATIENT (OUTPATIENT)
Dept: OUTPATIENT SERVICES | Facility: HOSPITAL | Age: 46
LOS: 1 days | End: 2021-12-19
Payer: COMMERCIAL

## 2021-12-19 VITALS — HEART RATE: 78 BPM | OXYGEN SATURATION: 100 %

## 2021-12-19 VITALS — HEART RATE: 93 BPM | OXYGEN SATURATION: 99 %

## 2021-12-19 DIAGNOSIS — Z90.49 ACQUIRED ABSENCE OF OTHER SPECIFIED PARTS OF DIGESTIVE TRACT: Chronic | ICD-10-CM

## 2021-12-19 DIAGNOSIS — Z98.890 OTHER SPECIFIED POSTPROCEDURAL STATES: Chronic | ICD-10-CM

## 2021-12-19 DIAGNOSIS — Z3A.00 WEEKS OF GESTATION OF PREGNANCY NOT SPECIFIED: ICD-10-CM

## 2021-12-19 DIAGNOSIS — D25.9 LEIOMYOMA OF UTERUS, UNSPECIFIED: Chronic | ICD-10-CM

## 2021-12-19 DIAGNOSIS — O26.899 OTHER SPECIFIED PREGNANCY RELATED CONDITIONS, UNSPECIFIED TRIMESTER: ICD-10-CM

## 2021-12-19 DIAGNOSIS — Z98.89 OTHER SPECIFIED POSTPROCEDURAL STATES: Chronic | ICD-10-CM

## 2021-12-19 PROCEDURE — 59025 FETAL NON-STRESS TEST: CPT

## 2021-12-19 PROCEDURE — G0463: CPT

## 2021-12-19 NOTE — OB RN TRIAGE NOTE - FALL HARM RISK - UNIVERSAL INTERVENTIONS
Bed in lowest position, wheels locked, appropriate side rails in place/Call bell, personal items and telephone in reach/Instruct patient to call for assistance before getting out of bed or chair/Non-slip footwear when patient is out of bed/[place] to call system/Physically safe environment - no spills, clutter or unnecessary equipment/Purposeful Proactive Rounding/Room/bathroom lighting operational, light cord in reach

## 2021-12-19 NOTE — OB RN TRIAGE NOTE - NS_OBGYNHISTORY_OBGYN_ALL_OB_FT
Misc x1  ectopic pregnancy  polyp  endometriosis  fibroids Misc x1  ectopic pregnancy  polyp  endometriosis  fibroids   IVF pregnancy

## 2021-12-19 NOTE — OB PROVIDER TRIAGE NOTE - HISTORY OF PRESENT ILLNESS
47 yo  at 36.1 wks gestational age presenting with decreased fetal movement.  Reports feeling good fetal movement earlier today, prior to bed did kick counts and did not feel 10x/hour with repositioning and cold drink.  Since arrival in triage has felt movement.  PNC uncomplicated w/ the exception of cHTN.  IVF pregnancy.  GBS collected in office yesterday.  EFW 2237g (18%) on  ATU sono.  Denies LOF, VB.  Endorses sporadic ctx.    ObHx:   ectopic pregnancy w/ failed MTX, surgical management w/ left salpingectomy following rupture   SAB  GynHx: +fibroids- posterior MOLLY 9.2 cm, +endometriosis w/ prior surgery. denies cysts, abnormal pap smears, STIs  PMH: cHTN  PSH: laparoscopic endometriosis [place] , pituitary tumor removal , open appendectomy age 10  Meds: nifedipine 60 XL, ASA  All: NKDA  Social: denies tobacco, alcohol, recreational drug use

## 2021-12-19 NOTE — OB PROVIDER TRIAGE NOTE - NSOBPROVIDERNOTE_OBGYN_ALL_OB_FT
45 yo  at 36.1 wks presenting w/ decreased fetal movement    -NST reactive  -BPP 8/8  -fetal movement felt in triage    dispo: reassuring fetal testing, clear for d/c home    d/w Dr. Rambo Gutierrez PGY3

## 2021-12-19 NOTE — OB PROVIDER TRIAGE NOTE - NSHPPHYSICALEXAM_GEN_ALL_CORE
Vital Signs Last 24 Hrs  T(C): 37.2 (19 Dec 2021 01:37), Max: 37.2 (19 Dec 2021 01:35)  T(F): 99 (19 Dec 2021 01:37), Max: 99 (19 Dec 2021 01:37)  HR: 78 (19 Dec 2021 02:03) (75 - 93)  BP: 132/94 (19 Dec 2021 01:50) (132/94 - 135/93)  BP(mean): --  RR: 18 (19 Dec 2021 01:37) (18 - 18)  SpO2: 100% (19 Dec 2021 02:03) (99% - 100%)    Gen: NAD  CV: RRR  Pulm: nonlabored breathing on room air  Abd: soft, gravid, non-tender  TAUS: breech, posterior placenta, MVP 7.7, BPP 8/8  FHT: baseline 130, mod joshua, +accels, no decels  [place]: irregular ctx

## 2021-12-23 ENCOUNTER — OUTPATIENT (OUTPATIENT)
Dept: OUTPATIENT SERVICES | Facility: HOSPITAL | Age: 46
LOS: 1 days | End: 2021-12-23
Payer: COMMERCIAL

## 2021-12-23 VITALS
SYSTOLIC BLOOD PRESSURE: 133 MMHG | DIASTOLIC BLOOD PRESSURE: 87 MMHG | RESPIRATION RATE: 18 BRPM | HEART RATE: 73 BPM | TEMPERATURE: 98 F

## 2021-12-23 VITALS — TEMPERATURE: 99 F | RESPIRATION RATE: 18 BRPM | SYSTOLIC BLOOD PRESSURE: 135 MMHG | DIASTOLIC BLOOD PRESSURE: 72 MMHG

## 2021-12-23 DIAGNOSIS — O26.899 OTHER SPECIFIED PREGNANCY RELATED CONDITIONS, UNSPECIFIED TRIMESTER: ICD-10-CM

## 2021-12-23 DIAGNOSIS — D25.9 LEIOMYOMA OF UTERUS, UNSPECIFIED: Chronic | ICD-10-CM

## 2021-12-23 DIAGNOSIS — Z98.890 OTHER SPECIFIED POSTPROCEDURAL STATES: Chronic | ICD-10-CM

## 2021-12-23 DIAGNOSIS — Z90.49 ACQUIRED ABSENCE OF OTHER SPECIFIED PARTS OF DIGESTIVE TRACT: Chronic | ICD-10-CM

## 2021-12-23 DIAGNOSIS — Z3A.00 WEEKS OF GESTATION OF PREGNANCY NOT SPECIFIED: ICD-10-CM

## 2021-12-23 DIAGNOSIS — Z98.89 OTHER SPECIFIED POSTPROCEDURAL STATES: Chronic | ICD-10-CM

## 2021-12-23 LAB
ALBUMIN SERPL ELPH-MCNC: 3.5 G/DL — SIGNIFICANT CHANGE UP (ref 3.3–5)
ALP SERPL-CCNC: 94 U/L — SIGNIFICANT CHANGE UP (ref 40–120)
ALT FLD-CCNC: 16 U/L — SIGNIFICANT CHANGE UP (ref 10–45)
ANION GAP SERPL CALC-SCNC: 11 MMOL/L — SIGNIFICANT CHANGE UP (ref 5–17)
APPEARANCE UR: ABNORMAL
APTT BLD: 27.6 SEC — SIGNIFICANT CHANGE UP (ref 27.5–35.5)
AST SERPL-CCNC: 20 U/L — SIGNIFICANT CHANGE UP (ref 10–40)
B PERT DNA SPEC QL NAA+PROBE: SIGNIFICANT CHANGE UP
BACTERIA # UR AUTO: ABNORMAL
BASOPHILS # BLD AUTO: 0.03 K/UL — SIGNIFICANT CHANGE UP (ref 0–0.2)
BASOPHILS NFR BLD AUTO: 0.5 % — SIGNIFICANT CHANGE UP (ref 0–2)
BILIRUB SERPL-MCNC: 0.2 MG/DL — SIGNIFICANT CHANGE UP (ref 0.2–1.2)
BILIRUB UR-MCNC: NEGATIVE — SIGNIFICANT CHANGE UP
BUN SERPL-MCNC: 7 MG/DL — SIGNIFICANT CHANGE UP (ref 7–23)
C PNEUM DNA SPEC QL NAA+PROBE: SIGNIFICANT CHANGE UP
CALCIUM SERPL-MCNC: 9.5 MG/DL — SIGNIFICANT CHANGE UP (ref 8.4–10.5)
CHLORIDE SERPL-SCNC: 106 MMOL/L — SIGNIFICANT CHANGE UP (ref 96–108)
CO2 SERPL-SCNC: 18 MMOL/L — LOW (ref 22–31)
COLOR SPEC: YELLOW — SIGNIFICANT CHANGE UP
CREAT ?TM UR-MCNC: 249 MG/DL — SIGNIFICANT CHANGE UP
CREAT SERPL-MCNC: 0.61 MG/DL — SIGNIFICANT CHANGE UP (ref 0.5–1.3)
DIFF PNL FLD: NEGATIVE — SIGNIFICANT CHANGE UP
EOSINOPHIL # BLD AUTO: 0.09 K/UL — SIGNIFICANT CHANGE UP (ref 0–0.5)
EOSINOPHIL NFR BLD AUTO: 1.4 % — SIGNIFICANT CHANGE UP (ref 0–6)
EPI CELLS # UR: 25 /HPF — HIGH
FIBRINOGEN PPP-MCNC: 898 MG/DL — HIGH (ref 290–520)
FLUAV H1 2009 PAND RNA SPEC QL NAA+PROBE: SIGNIFICANT CHANGE UP
FLUAV H1 RNA SPEC QL NAA+PROBE: SIGNIFICANT CHANGE UP
FLUAV H3 RNA SPEC QL NAA+PROBE: SIGNIFICANT CHANGE UP
FLUAV SUBTYP SPEC NAA+PROBE: SIGNIFICANT CHANGE UP
FLUBV RNA SPEC QL NAA+PROBE: SIGNIFICANT CHANGE UP
GLUCOSE SERPL-MCNC: 79 MG/DL — SIGNIFICANT CHANGE UP (ref 70–99)
GLUCOSE UR QL: NEGATIVE — SIGNIFICANT CHANGE UP
HADV DNA SPEC QL NAA+PROBE: SIGNIFICANT CHANGE UP
HCOV PNL SPEC NAA+PROBE: SIGNIFICANT CHANGE UP
HCT VFR BLD CALC: 35.9 % — SIGNIFICANT CHANGE UP (ref 34.5–45)
HGB BLD-MCNC: 12.4 G/DL — SIGNIFICANT CHANGE UP (ref 11.5–15.5)
HMPV RNA SPEC QL NAA+PROBE: SIGNIFICANT CHANGE UP
HPIV1 RNA SPEC QL NAA+PROBE: SIGNIFICANT CHANGE UP
HPIV2 RNA SPEC QL NAA+PROBE: SIGNIFICANT CHANGE UP
HPIV3 RNA SPEC QL NAA+PROBE: SIGNIFICANT CHANGE UP
HPIV4 RNA SPEC QL NAA+PROBE: SIGNIFICANT CHANGE UP
HYALINE CASTS # UR AUTO: 11 /LPF — HIGH (ref 0–2)
IMM GRANULOCYTES NFR BLD AUTO: 0.3 % — SIGNIFICANT CHANGE UP (ref 0–1.5)
INR BLD: 0.9 RATIO — SIGNIFICANT CHANGE UP (ref 0.88–1.16)
KETONES UR-MCNC: NEGATIVE — SIGNIFICANT CHANGE UP
LDH SERPL L TO P-CCNC: 164 U/L — SIGNIFICANT CHANGE UP (ref 50–242)
LEUKOCYTE ESTERASE UR-ACNC: NEGATIVE — SIGNIFICANT CHANGE UP
LYMPHOCYTES # BLD AUTO: 1.48 K/UL — SIGNIFICANT CHANGE UP (ref 1–3.3)
LYMPHOCYTES # BLD AUTO: 23.1 % — SIGNIFICANT CHANGE UP (ref 13–44)
MCHC RBC-ENTMCNC: 33.6 PG — SIGNIFICANT CHANGE UP (ref 27–34)
MCHC RBC-ENTMCNC: 34.5 GM/DL — SIGNIFICANT CHANGE UP (ref 32–36)
MCV RBC AUTO: 97.3 FL — SIGNIFICANT CHANGE UP (ref 80–100)
MONOCYTES # BLD AUTO: 0.54 K/UL — SIGNIFICANT CHANGE UP (ref 0–0.9)
MONOCYTES NFR BLD AUTO: 8.4 % — SIGNIFICANT CHANGE UP (ref 2–14)
NEUTROPHILS # BLD AUTO: 4.24 K/UL — SIGNIFICANT CHANGE UP (ref 1.8–7.4)
NEUTROPHILS NFR BLD AUTO: 66.3 % — SIGNIFICANT CHANGE UP (ref 43–77)
NITRITE UR-MCNC: NEGATIVE — SIGNIFICANT CHANGE UP
NRBC # BLD: 0 /100 WBCS — SIGNIFICANT CHANGE UP (ref 0–0)
PH UR: 6.5 — SIGNIFICANT CHANGE UP (ref 5–8)
PLATELET # BLD AUTO: 234 K/UL — SIGNIFICANT CHANGE UP (ref 150–400)
POTASSIUM SERPL-MCNC: 4.3 MMOL/L — SIGNIFICANT CHANGE UP (ref 3.5–5.3)
POTASSIUM SERPL-SCNC: 4.3 MMOL/L — SIGNIFICANT CHANGE UP (ref 3.5–5.3)
PROT ?TM UR-MCNC: 45 MG/DL — HIGH (ref 0–12)
PROT ?TM UR-MCNC: 58 MG/DL — HIGH (ref 0–12)
PROT SERPL-MCNC: 6.8 G/DL — SIGNIFICANT CHANGE UP (ref 6–8.3)
PROT UR-MCNC: ABNORMAL
PROT/CREAT UR-RTO: 0.2 RATIO — SIGNIFICANT CHANGE UP (ref 0–0.2)
PROTHROM AB SERPL-ACNC: 10.9 SEC — SIGNIFICANT CHANGE UP (ref 10.6–13.6)
RAPID RVP RESULT: SIGNIFICANT CHANGE UP
RBC # BLD: 3.69 M/UL — LOW (ref 3.8–5.2)
RBC # FLD: 13.2 % — SIGNIFICANT CHANGE UP (ref 10.3–14.5)
RBC CASTS # UR COMP ASSIST: 2 /HPF — SIGNIFICANT CHANGE UP (ref 0–4)
RSV RNA SPEC QL NAA+PROBE: SIGNIFICANT CHANGE UP
RV+EV RNA SPEC QL NAA+PROBE: SIGNIFICANT CHANGE UP
SARS-COV-2 RNA SPEC QL NAA+PROBE: SIGNIFICANT CHANGE UP
SODIUM SERPL-SCNC: 135 MMOL/L — SIGNIFICANT CHANGE UP (ref 135–145)
SP GR SPEC: 1.03 — HIGH (ref 1.01–1.02)
URATE SERPL-MCNC: 4 MG/DL — SIGNIFICANT CHANGE UP (ref 2.5–7)
UROBILINOGEN FLD QL: ABNORMAL
WBC # BLD: 6.4 K/UL — SIGNIFICANT CHANGE UP (ref 3.8–10.5)
WBC # FLD AUTO: 6.4 K/UL — SIGNIFICANT CHANGE UP (ref 3.8–10.5)
WBC UR QL: 3 /HPF — SIGNIFICANT CHANGE UP (ref 0–5)

## 2021-12-23 PROCEDURE — 83615 LACTATE (LD) (LDH) ENZYME: CPT

## 2021-12-23 PROCEDURE — 0225U NFCT DS DNA&RNA 21 SARSCOV2: CPT

## 2021-12-23 PROCEDURE — G0463: CPT

## 2021-12-23 PROCEDURE — 81001 URINALYSIS AUTO W/SCOPE: CPT

## 2021-12-23 PROCEDURE — 80053 COMPREHEN METABOLIC PANEL: CPT

## 2021-12-23 PROCEDURE — 85025 COMPLETE CBC W/AUTO DIFF WBC: CPT

## 2021-12-23 PROCEDURE — 85730 THROMBOPLASTIN TIME PARTIAL: CPT

## 2021-12-23 PROCEDURE — 84156 ASSAY OF PROTEIN URINE: CPT

## 2021-12-23 PROCEDURE — 59025 FETAL NON-STRESS TEST: CPT

## 2021-12-23 PROCEDURE — 36415 COLL VENOUS BLD VENIPUNCTURE: CPT

## 2021-12-23 PROCEDURE — 82570 ASSAY OF URINE CREATININE: CPT

## 2021-12-23 PROCEDURE — 84550 ASSAY OF BLOOD/URIC ACID: CPT

## 2021-12-23 PROCEDURE — 85384 FIBRINOGEN ACTIVITY: CPT

## 2021-12-23 PROCEDURE — 85610 PROTHROMBIN TIME: CPT

## 2021-12-23 RX ORDER — DIPHENHYDRAMINE HCL 50 MG
25 CAPSULE ORAL ONCE
Refills: 0 | Status: COMPLETED | OUTPATIENT
Start: 2021-12-23 | End: 2021-12-23

## 2021-12-23 RX ORDER — ACETAMINOPHEN 500 MG
975 TABLET ORAL ONCE
Refills: 0 | Status: COMPLETED | OUTPATIENT
Start: 2021-12-23 | End: 2021-12-23

## 2021-12-23 RX ADMIN — Medication 975 MILLIGRAM(S): at 12:05

## 2021-12-23 RX ADMIN — Medication 25 MILLIGRAM(S): at 14:52

## 2021-12-23 NOTE — OB PROVIDER TRIAGE NOTE - NSHPPHYSICALEXAM_GEN_ALL_CORE
ICU Vital Signs Last 24 Hrs  T(C): 37.2 (23 Dec 2021 11:30), Max: 37.2 (23 Dec 2021 11:24)  T(F): 99 (23 Dec 2021 11:30), Max: 99 (23 Dec 2021 11:30)  HR: 74 (23 Dec 2021 12:08) (71 - 91)  BP: 124/79 (23 Dec 2021 12:04) (107/63 - 135/72)  BP(mean): --  ABP: --  ABP(mean): --  RR: 18 (23 Dec 2021 11:30) (18 - 18)  SpO2: 99% (23 Dec 2021 12:08) (97% - 100%)    gen: A&ox3  CV: rrr s1s2  abd: gravid soft  VE: deferred  EFM: 135 moderate variability, + acels, -decels  toco: none

## 2021-12-23 NOTE — OB PROVIDER TRIAGE NOTE - NS_FETALMOVEMENT_OBGYN_ALL_OB
Pertinent PMH/PSH/FHx/SHx and Review of Systems contained within:    65yo M w no significant PMH presents to ED c/o weakness.  Pt states he has been feeling generally weak for about 2wks w difficulty walking.  Pt went to stay w daughter 2d ago.  She reports pt has been weak w mild cough.  Denies CP, SOB, abd pain, vomiting, diarrhea, urinary sx, syncope.  On arrival to ED pt febrile & tachy.    +fever/chills, No photophobia/eye pain/changes in vision, No ear pain/sore throat/dysphagia, No chest pain/palpitations, no SOB/wheeze/stridor, No abdominal pain, no dysuria/frequency/discharge, No neck/back pain, no rash, no changes in neurological status/function. Present, unchanged

## 2021-12-23 NOTE — OB PROVIDER TRIAGE NOTE - ATTENDING COMMENTS
PT COMES IN AT 36W5D C/O H.A 8/10. H/O CHTN ON PROCARDIA. BPS AT HOME NL TO OCC MILD RANGE. HERE BPS NL. [facility]H LABS NL YESTERDAY & TODAY. WAS GIVEN TYLENOL WITH IMPROVEMENT TO 6/10.  PT APPEARS VERY COMFORTABLE. DTR 1+  NST REACTIVE.  US REVEALS SIUP, TRANSVERSE LIE, HEAD TO MAT RT, BACK DOWN WITH LG MOLLY FIBROID OBSTRUCTING BIRTH CANAL. PT C/O SOME MILD NASAL CONGESTION. HA WAS FRONTAL. NO OTHER SXS. WILL GIVE BENEDRYL. AWAITING RESULTS OF COVID. IF + SUGGEST WE POSTPONE SURGERY BY 1 WEEK. PT WAS HAVING C/S 12/28 FOR CHTN & DYSTOCIA. HOME BPS DISCUSSED.   SURGERY DISCUSSED WITH TRANSVERSE LIE BACK DOWN. HIGH TRANSVERSE INCISION & CLASSICAL DISCUSSED.    LETICIA GARCIA

## 2021-12-23 NOTE — OB PROVIDER TRIAGE NOTE - HISTORY OF PRESENT ILLNESS
45 y/o  [facility] 1/15/2022 @ 36.5 weeks presenting to l&D with a HA x 2 days despite Tylenol use last night and this morning at 4a. The patient has a h/o cHTN requiring Procardia 60 mg XL. In the office last night, the patient's BP was 142/100, repeated and found to be WNL. HELLP labs sent and found to be WNL. This morning prior to Procardia, it was 133/88. The patient reports having a stuffy nose x 2 days.  Denies cough/fever/exhaustion or Covid-like symptoms.  The patient denies blurry vision, epigastric pain. The patient additionally denies contx, LOF, VB. endorses good fetal movement.     allergies: nkda  meds: Procardia 60 mg XL daily, Asa 81 mg   PNC: c/w cHTN developed at 12 weeks, seen by Dr. Ibarra (10/21) echo/EKG WNL  scheduled for a pltcs  for 9cm MOLLY obstructing fibroid    Medhx: cHTN. The patient denies cardiac, pulm, heme, GI, neuro  Obhx: () ruptured ectopic pregnancy requiring MTX/salpingectomy  () chem pregnancy  Sxhx: () open appendectomy  () pituitary adenoma removed  () ex-lap excision of endometriomas  () myomectomy  (2018) hysteroscopic D+C  Gynhx: pt denies abn. pap, STDs,cysts  Sochx: pt denies  Famhx: pt denies

## 2021-12-23 NOTE — OB PROVIDER TRIAGE NOTE - NSOBPROVIDERNOTE_OBGYN_ALL_OB_FT
45 y/o  @ 36.5 weeks presenting to L&D for a HA x 2 days despite tylenol usage. The patient to be ruled out for siPEC vs. COVID  -HELLP labs  -COVID PCR    d/w Dr. Bib Meade NP 45 y/o  @ 36.5 weeks presenting to L&D for a HA x 2 days despite tylenol usage. The patient to be ruled out for siPEC vs. COVID  -HELLP labs  -COVID PCR    d/w Dr. Bib Meade NP    3404- Labs reviewed and within normal limits. COVID result negative. Patient seen bedside and reports headache improved but feels hungry. Will discharge with return precautions.

## 2021-12-23 NOTE — OB PROVIDER TRIAGE NOTE - LIVING CHILDREN, OB PROFILE
0 Partial Purse String (Simple) Text: Given the location of the defect and the characteristics of the surrounding skin a simple purse string closure was deemed most appropriate.  Undermining was performed circumferentially around the surgical defect.  A purse string suture was then placed and tightened. Wound tension of the circular defect prevented complete closure of the wound.

## 2021-12-23 NOTE — OB PROVIDER TRIAGE NOTE - NSHPLABSRESULTS_GEN_ALL_CORE
12.4   6.40  )-----------( 234      ( 23 Dec 2021 12:18 )             35.9     12-23    135  |  106  |  7   ----------------------------<  79  4.3   |  18<L>  |  0.61    Ca    9.5      23 Dec 2021 12:18    TPro  6.8  /  Alb  3.5  /  TBili  0.2  /  DBili  x   /  AST  20  /  ALT  16  /  AlkPhos  94  12-23    PT/INR - ( 23 Dec 2021 12:18 )   PT: 10.9 sec;   INR: 0.90 ratio         PTT - ( 23 Dec 2021 12:18 )  PTT:27.6 sec    P/C: .2  urinalysis: 30 mg proteinuria    Covid pending

## 2021-12-26 ENCOUNTER — OUTPATIENT (OUTPATIENT)
Dept: OUTPATIENT SERVICES | Facility: HOSPITAL | Age: 46
LOS: 1 days | End: 2021-12-26
Payer: COMMERCIAL

## 2021-12-26 DIAGNOSIS — Z98.89 OTHER SPECIFIED POSTPROCEDURAL STATES: Chronic | ICD-10-CM

## 2021-12-26 DIAGNOSIS — Z11.52 ENCOUNTER FOR SCREENING FOR COVID-19: ICD-10-CM

## 2021-12-26 DIAGNOSIS — Z90.49 ACQUIRED ABSENCE OF OTHER SPECIFIED PARTS OF DIGESTIVE TRACT: Chronic | ICD-10-CM

## 2021-12-26 DIAGNOSIS — Z98.890 OTHER SPECIFIED POSTPROCEDURAL STATES: Chronic | ICD-10-CM

## 2021-12-26 DIAGNOSIS — D25.9 LEIOMYOMA OF UTERUS, UNSPECIFIED: Chronic | ICD-10-CM

## 2021-12-26 LAB — SARS-COV-2 RNA SPEC QL NAA+PROBE: SIGNIFICANT CHANGE UP

## 2021-12-26 PROCEDURE — U0005: CPT

## 2021-12-26 PROCEDURE — U0003: CPT

## 2021-12-26 PROCEDURE — C9803: CPT

## 2021-12-27 ENCOUNTER — TRANSCRIPTION ENCOUNTER (OUTPATIENT)
Age: 46
End: 2021-12-27

## 2021-12-28 ENCOUNTER — TRANSCRIPTION ENCOUNTER (OUTPATIENT)
Age: 46
End: 2021-12-28

## 2021-12-28 ENCOUNTER — INPATIENT (INPATIENT)
Facility: HOSPITAL | Age: 46
LOS: 1 days | Discharge: HOME | End: 2021-12-30
Attending: OBSTETRICS & GYNECOLOGY | Admitting: OBSTETRICS & GYNECOLOGY
Payer: COMMERCIAL

## 2021-12-28 DIAGNOSIS — Z98.89 OTHER SPECIFIED POSTPROCEDURAL STATES: Chronic | ICD-10-CM

## 2021-12-28 DIAGNOSIS — O34.519: ICD-10-CM

## 2021-12-28 DIAGNOSIS — D25.9 LEIOMYOMA OF UTERUS, UNSPECIFIED: Chronic | ICD-10-CM

## 2021-12-28 DIAGNOSIS — Z98.890 OTHER SPECIFIED POSTPROCEDURAL STATES: Chronic | ICD-10-CM

## 2021-12-28 DIAGNOSIS — Z34.90 ENCOUNTER FOR SUPERVISION OF NORMAL PREGNANCY, UNSPECIFIED, UNSPECIFIED TRIMESTER: ICD-10-CM

## 2021-12-28 DIAGNOSIS — O09.512 SUPERVISION OF ELDERLY PRIMIGRAVIDA, SECOND TRIMESTER: ICD-10-CM

## 2021-12-28 DIAGNOSIS — Z90.49 ACQUIRED ABSENCE OF OTHER SPECIFIED PARTS OF DIGESTIVE TRACT: Chronic | ICD-10-CM

## 2021-12-28 LAB
ALBUMIN SERPL ELPH-MCNC: 3.5 G/DL — SIGNIFICANT CHANGE UP (ref 3.3–5)
ALP SERPL-CCNC: 89 U/L — SIGNIFICANT CHANGE UP (ref 40–120)
ALT FLD-CCNC: 17 U/L — SIGNIFICANT CHANGE UP (ref 10–45)
ANION GAP SERPL CALC-SCNC: 12 MMOL/L — SIGNIFICANT CHANGE UP (ref 5–17)
APTT BLD: 27 SEC — LOW (ref 27.5–35.5)
AST SERPL-CCNC: 18 U/L — SIGNIFICANT CHANGE UP (ref 10–40)
BASOPHILS # BLD AUTO: 0.03 K/UL — SIGNIFICANT CHANGE UP (ref 0–0.2)
BASOPHILS NFR BLD AUTO: 0.4 % — SIGNIFICANT CHANGE UP (ref 0–2)
BILIRUB SERPL-MCNC: 0.2 MG/DL — SIGNIFICANT CHANGE UP (ref 0.2–1.2)
BUN SERPL-MCNC: 10 MG/DL — SIGNIFICANT CHANGE UP (ref 7–23)
CALCIUM SERPL-MCNC: 9.3 MG/DL — SIGNIFICANT CHANGE UP (ref 8.4–10.5)
CHLORIDE SERPL-SCNC: 103 MMOL/L — SIGNIFICANT CHANGE UP (ref 96–108)
CO2 SERPL-SCNC: 18 MMOL/L — LOW (ref 22–31)
CREAT SERPL-MCNC: 0.77 MG/DL — SIGNIFICANT CHANGE UP (ref 0.5–1.3)
EOSINOPHIL # BLD AUTO: 0.08 K/UL — SIGNIFICANT CHANGE UP (ref 0–0.5)
EOSINOPHIL NFR BLD AUTO: 1.1 % — SIGNIFICANT CHANGE UP (ref 0–6)
FIBRINOGEN PPP-MCNC: 880 MG/DL — HIGH (ref 290–520)
GLUCOSE SERPL-MCNC: 75 MG/DL — SIGNIFICANT CHANGE UP (ref 70–99)
HCT VFR BLD CALC: 35.3 % — SIGNIFICANT CHANGE UP (ref 34.5–45)
HGB BLD-MCNC: 12.4 G/DL — SIGNIFICANT CHANGE UP (ref 11.5–15.5)
IMM GRANULOCYTES NFR BLD AUTO: 0.4 % — SIGNIFICANT CHANGE UP (ref 0–1.5)
INR BLD: 0.95 RATIO — SIGNIFICANT CHANGE UP (ref 0.88–1.16)
LDH SERPL L TO P-CCNC: 150 U/L — SIGNIFICANT CHANGE UP (ref 50–242)
LYMPHOCYTES # BLD AUTO: 1.67 K/UL — SIGNIFICANT CHANGE UP (ref 1–3.3)
LYMPHOCYTES # BLD AUTO: 22 % — SIGNIFICANT CHANGE UP (ref 13–44)
MCHC RBC-ENTMCNC: 33.6 PG — SIGNIFICANT CHANGE UP (ref 27–34)
MCHC RBC-ENTMCNC: 35.1 GM/DL — SIGNIFICANT CHANGE UP (ref 32–36)
MCV RBC AUTO: 95.7 FL — SIGNIFICANT CHANGE UP (ref 80–100)
MONOCYTES # BLD AUTO: 0.68 K/UL — SIGNIFICANT CHANGE UP (ref 0–0.9)
MONOCYTES NFR BLD AUTO: 9 % — SIGNIFICANT CHANGE UP (ref 2–14)
NEUTROPHILS # BLD AUTO: 5.09 K/UL — SIGNIFICANT CHANGE UP (ref 1.8–7.4)
NEUTROPHILS NFR BLD AUTO: 67.1 % — SIGNIFICANT CHANGE UP (ref 43–77)
NRBC # BLD: 0 /100 WBCS — SIGNIFICANT CHANGE UP (ref 0–0)
PLATELET # BLD AUTO: 229 K/UL — SIGNIFICANT CHANGE UP (ref 150–400)
POTASSIUM SERPL-MCNC: 4.2 MMOL/L — SIGNIFICANT CHANGE UP (ref 3.5–5.3)
POTASSIUM SERPL-SCNC: 4.2 MMOL/L — SIGNIFICANT CHANGE UP (ref 3.5–5.3)
PROT SERPL-MCNC: 7 G/DL — SIGNIFICANT CHANGE UP (ref 6–8.3)
PROTHROM AB SERPL-ACNC: 11.4 SEC — SIGNIFICANT CHANGE UP (ref 10.6–13.6)
RBC # BLD: 3.69 M/UL — LOW (ref 3.8–5.2)
RBC # FLD: 13.1 % — SIGNIFICANT CHANGE UP (ref 10.3–14.5)
SODIUM SERPL-SCNC: 133 MMOL/L — LOW (ref 135–145)
T PALLIDUM AB TITR SER: NEGATIVE — SIGNIFICANT CHANGE UP
URATE SERPL-MCNC: 4.7 MG/DL — SIGNIFICANT CHANGE UP (ref 2.5–7)
WBC # BLD: 7.58 K/UL — SIGNIFICANT CHANGE UP (ref 3.8–10.5)
WBC # FLD AUTO: 7.58 K/UL — SIGNIFICANT CHANGE UP (ref 3.8–10.5)

## 2021-12-28 PROCEDURE — 88305 TISSUE EXAM BY PATHOLOGIST: CPT | Mod: 26

## 2021-12-28 RX ORDER — TRANEXAMIC ACID 100 MG/ML
1000 INJECTION, SOLUTION INTRAVENOUS ONCE
Refills: 0 | Status: COMPLETED | OUTPATIENT
Start: 2021-12-28 | End: 2021-12-28

## 2021-12-28 RX ORDER — OXYTOCIN 10 UNIT/ML
333.33 VIAL (ML) INJECTION
Qty: 20 | Refills: 0 | Status: DISCONTINUED | OUTPATIENT
Start: 2021-12-28 | End: 2021-12-30

## 2021-12-28 RX ORDER — MAGNESIUM HYDROXIDE 400 MG/1
30 TABLET, CHEWABLE ORAL
Refills: 0 | Status: DISCONTINUED | OUTPATIENT
Start: 2021-12-28 | End: 2021-12-30

## 2021-12-28 RX ORDER — ACETAMINOPHEN 500 MG
1000 TABLET ORAL ONCE
Refills: 0 | Status: COMPLETED | OUTPATIENT
Start: 2021-12-28 | End: 2021-12-28

## 2021-12-28 RX ORDER — NALBUPHINE HYDROCHLORIDE 10 MG/ML
2.5 INJECTION, SOLUTION INTRAMUSCULAR; INTRAVENOUS; SUBCUTANEOUS EVERY 6 HOURS
Refills: 0 | Status: DISCONTINUED | OUTPATIENT
Start: 2021-12-28 | End: 2021-12-30

## 2021-12-28 RX ORDER — OXYCODONE HYDROCHLORIDE 5 MG/1
5 TABLET ORAL
Refills: 0 | Status: DISCONTINUED | OUTPATIENT
Start: 2021-12-28 | End: 2021-12-28

## 2021-12-28 RX ORDER — CARBOPROST TROMETHAMINE 250 UG/ML
250 INJECTION, SOLUTION INTRAMUSCULAR ONCE
Refills: 0 | Status: COMPLETED | OUTPATIENT
Start: 2021-12-28 | End: 2021-12-28

## 2021-12-28 RX ORDER — DEXAMETHASONE 0.5 MG/5ML
4 ELIXIR ORAL EVERY 6 HOURS
Refills: 0 | Status: DISCONTINUED | OUTPATIENT
Start: 2021-12-28 | End: 2021-12-30

## 2021-12-28 RX ORDER — CHLORHEXIDINE GLUCONATE 213 G/1000ML
1 SOLUTION TOPICAL ONCE
Refills: 0 | Status: COMPLETED | OUTPATIENT
Start: 2021-12-28 | End: 2021-12-28

## 2021-12-28 RX ORDER — SODIUM CHLORIDE 9 MG/ML
500 INJECTION, SOLUTION INTRAVENOUS ONCE
Refills: 0 | Status: DISCONTINUED | OUTPATIENT
Start: 2021-12-28 | End: 2021-12-30

## 2021-12-28 RX ORDER — SIMETHICONE 80 MG/1
80 TABLET, CHEWABLE ORAL EVERY 4 HOURS
Refills: 0 | Status: DISCONTINUED | OUTPATIENT
Start: 2021-12-28 | End: 2021-12-30

## 2021-12-28 RX ORDER — MORPHINE SULFATE 50 MG/1
0.1 CAPSULE, EXTENDED RELEASE ORAL ONCE
Refills: 0 | Status: DISCONTINUED | OUTPATIENT
Start: 2021-12-28 | End: 2021-12-29

## 2021-12-28 RX ORDER — ACETAMINOPHEN 500 MG
975 TABLET ORAL
Refills: 0 | Status: DISCONTINUED | OUTPATIENT
Start: 2021-12-28 | End: 2021-12-30

## 2021-12-28 RX ORDER — HEPARIN SODIUM 5000 [USP'U]/ML
5000 INJECTION INTRAVENOUS; SUBCUTANEOUS EVERY 12 HOURS
Refills: 0 | Status: DISCONTINUED | OUTPATIENT
Start: 2021-12-28 | End: 2021-12-30

## 2021-12-28 RX ORDER — DIPHENHYDRAMINE HCL 50 MG
25 CAPSULE ORAL EVERY 6 HOURS
Refills: 0 | Status: DISCONTINUED | OUTPATIENT
Start: 2021-12-28 | End: 2021-12-30

## 2021-12-28 RX ORDER — NALOXONE HYDROCHLORIDE 4 MG/.1ML
0.1 SPRAY NASAL
Refills: 0 | Status: DISCONTINUED | OUTPATIENT
Start: 2021-12-28 | End: 2021-12-30

## 2021-12-28 RX ORDER — IBUPROFEN 200 MG
600 TABLET ORAL EVERY 6 HOURS
Refills: 0 | Status: COMPLETED | OUTPATIENT
Start: 2021-12-28 | End: 2022-11-26

## 2021-12-28 RX ORDER — TETANUS TOXOID, REDUCED DIPHTHERIA TOXOID AND ACELLULAR PERTUSSIS VACCINE, ADSORBED 5; 2.5; 8; 8; 2.5 [IU]/.5ML; [IU]/.5ML; UG/.5ML; UG/.5ML; UG/.5ML
0.5 SUSPENSION INTRAMUSCULAR ONCE
Refills: 0 | Status: DISCONTINUED | OUTPATIENT
Start: 2021-12-28 | End: 2021-12-30

## 2021-12-28 RX ORDER — LANOLIN
1 OINTMENT (GRAM) TOPICAL EVERY 6 HOURS
Refills: 0 | Status: DISCONTINUED | OUTPATIENT
Start: 2021-12-28 | End: 2021-12-30

## 2021-12-28 RX ORDER — FAMOTIDINE 10 MG/ML
20 INJECTION INTRAVENOUS ONCE
Refills: 0 | Status: COMPLETED | OUTPATIENT
Start: 2021-12-28 | End: 2021-12-28

## 2021-12-28 RX ORDER — DIPHENOXYLATE HCL/ATROPINE 2.5-.025MG
2 TABLET ORAL ONCE
Refills: 0 | Status: DISCONTINUED | OUTPATIENT
Start: 2021-12-28 | End: 2021-12-30

## 2021-12-28 RX ORDER — OXYCODONE HYDROCHLORIDE 5 MG/1
5 TABLET ORAL
Refills: 0 | Status: COMPLETED | OUTPATIENT
Start: 2021-12-28 | End: 2022-01-04

## 2021-12-28 RX ORDER — SODIUM CHLORIDE 9 MG/ML
1000 INJECTION, SOLUTION INTRAVENOUS
Refills: 0 | Status: DISCONTINUED | OUTPATIENT
Start: 2021-12-28 | End: 2021-12-30

## 2021-12-28 RX ORDER — NIFEDIPINE 30 MG
60 TABLET, EXTENDED RELEASE 24 HR ORAL DAILY
Refills: 0 | Status: DISCONTINUED | OUTPATIENT
Start: 2021-12-28 | End: 2021-12-30

## 2021-12-28 RX ORDER — CITRIC ACID/SODIUM CITRATE 300-500 MG
15 SOLUTION, ORAL ORAL ONCE
Refills: 0 | Status: COMPLETED | OUTPATIENT
Start: 2021-12-28 | End: 2021-12-28

## 2021-12-28 RX ORDER — ONDANSETRON 8 MG/1
4 TABLET, FILM COATED ORAL EVERY 6 HOURS
Refills: 0 | Status: DISCONTINUED | OUTPATIENT
Start: 2021-12-28 | End: 2021-12-30

## 2021-12-28 RX ORDER — OXYCODONE HYDROCHLORIDE 5 MG/1
10 TABLET ORAL
Refills: 0 | Status: DISCONTINUED | OUTPATIENT
Start: 2021-12-28 | End: 2021-12-28

## 2021-12-28 RX ORDER — KETOROLAC TROMETHAMINE 30 MG/ML
30 SYRINGE (ML) INJECTION EVERY 6 HOURS
Refills: 0 | Status: DISCONTINUED | OUTPATIENT
Start: 2021-12-28 | End: 2021-12-29

## 2021-12-28 RX ORDER — OXYCODONE HYDROCHLORIDE 5 MG/1
5 TABLET ORAL ONCE
Refills: 0 | Status: DISCONTINUED | OUTPATIENT
Start: 2021-12-28 | End: 2021-12-30

## 2021-12-28 RX ADMIN — SODIUM CHLORIDE 125 MILLILITER(S): 9 INJECTION, SOLUTION INTRAVENOUS at 13:45

## 2021-12-28 RX ADMIN — CARBOPROST TROMETHAMINE 250 MICROGRAM(S): 250 INJECTION, SOLUTION INTRAMUSCULAR at 12:27

## 2021-12-28 RX ADMIN — Medication 15 MILLILITER(S): at 10:18

## 2021-12-28 RX ADMIN — Medication 100 MILLIGRAM(S): at 11:30

## 2021-12-28 RX ADMIN — Medication 1000 MILLIUNIT(S)/MIN: at 14:22

## 2021-12-28 RX ADMIN — FAMOTIDINE 20 MILLIGRAM(S): 10 INJECTION INTRAVENOUS at 10:00

## 2021-12-28 RX ADMIN — SODIUM CHLORIDE 2000 MILLILITER(S): 9 INJECTION, SOLUTION INTRAVENOUS at 09:00

## 2021-12-28 RX ADMIN — CHLORHEXIDINE GLUCONATE 1 APPLICATION(S): 213 SOLUTION TOPICAL at 08:15

## 2021-12-28 RX ADMIN — Medication 30 MILLIGRAM(S): at 20:53

## 2021-12-28 RX ADMIN — TRANEXAMIC ACID 220 MILLIGRAM(S): 100 INJECTION, SOLUTION INTRAVENOUS at 11:25

## 2021-12-28 RX ADMIN — HEPARIN SODIUM 5000 UNIT(S): 5000 INJECTION INTRAVENOUS; SUBCUTANEOUS at 20:53

## 2021-12-28 RX ADMIN — Medication 60 MILLIGRAM(S): at 14:16

## 2021-12-28 RX ADMIN — Medication 30 MILLIGRAM(S): at 13:30

## 2021-12-28 RX ADMIN — Medication 400 MILLIGRAM(S): at 16:38

## 2021-12-28 RX ADMIN — ONDANSETRON 4 MILLIGRAM(S): 8 TABLET, FILM COATED ORAL at 19:11

## 2021-12-28 RX ADMIN — Medication 30 MILLIGRAM(S): at 21:23

## 2021-12-28 NOTE — OB PROVIDER H&P - NSHPREVIEWOFSYSTEMS_GEN_ALL_CORE
ICU Vital Signs Last 24 Hrs  T(C): 37 (28 Dec 2021 09:07), Max: 37 (28 Dec 2021 09:07)  T(F): --  HR: 76 (28 Dec 2021 09:07) (76 - 76)  BP: 138/94 (28 Dec 2021 09:07) (138/94 - 138/94)  BP(mean): --  ABP: --  ABP(mean): --  RR: 16 (28 Dec 2021 09:07) (16 - 16)  SpO2: --    gen; A&Ox3  CV: rrr s1s2  abd: gravid soft  VE: deferred  EFM: 135 moderate variability, + acels, -decels  toco: none  bedside sonogram: transverse, cephalic left lateral, back up as per Dr. Ricks ICU Vital Signs Last 24 Hrs  T(C): 37 (28 Dec 2021 09:07), Max: 37 (28 Dec 2021 09:07)  T(F): --  HR: 76 (28 Dec 2021 09:07) (76 - 76)  BP: 138/94 (28 Dec 2021 09:07) (138/94 - 138/94)  BP(mean): --  ABP: --  ABP(mean): --  RR: 16 (28 Dec 2021 09:07) (16 - 16)  SpO2: --    gen; A&Ox3  CV: rrr s1s2  abd: gravid soft  VE: deferred  EFM: 135 moderate variability, + acels, -decels  toco: none  bedside sonogram: transverse, cephalic left lateral, back down as per Dr. Ricks

## 2021-12-28 NOTE — DISCHARGE NOTE OB - CARE PLAN
1 Principal Discharge DX:	Delivery by classical  section  Assessment and plan of treatment:	After discharge, please stay on pelvic rest for 6 weeks, meaning no sexual intercourse, no tampons and no douching.  No driving for 2 weeks as women can loose a lot of blood during delivery and there is a possibility of being lightheaded/fainting.  No lifting objects heavier than baby for two weeks.  Expect to have vaginal bleeding/spotting for up to six weeks.  The bleeding should get lighter and more white/light brown with time.  For bleeding soaking more than a pad an hour or passing clots greater than the size of your fist, come in to the emergency department.    Follow up in the office in 2 weeks for incision check.  Call your OBGYN for noticeable increase in redness or swelling at incision, discharge from incision, or opening of skin at incision site.  Secondary Diagnosis:	Chronic hypertension  Assessment and plan of treatment:	Continue to take your blood pressure at least twice a day, and take your medications as prescribed.  Call your OB if your pressures are greater than 150/100, or if you experience severe or persistent headaches, visual changes, persistent nausea/vomiting, trouble breathing, chest pain, or severe abdominal pain. Please follow up with your OBGYN within 1 week to review your blood pressures.

## 2021-12-28 NOTE — OB PROVIDER DELIVERY SUMMARY - NSPROVIDERDELIVERYNOTE_OBGYN_ALL_OB_FT
Viable infant apgars 4/9 trasnverse, delivered breech via vertical uterine incision. 10 cm lower uterine segment fibroid, left sided pedunculated 1.5cm fibroid removed.   EBL 1000  IVF 2000

## 2021-12-28 NOTE — OB PROVIDER DELIVERY SUMMARY - NSSELHIDDEN_OBGYN_ALL_OB_FT
[NS_DeliveryAttending1_OBGYN_ALL_OB_FT:[id number]],[NS_DeliveryAttending2_OBGYN_ALL_OB_FT:[id number]],[NS_DeliveryRN_OBGYN_ALL_OB_FT:[id number]==]

## 2021-12-28 NOTE — OB PROVIDER H&P - ASSESSMENT
45 y/o  @ 37.3 weeks admitted for a scheduled pltcs with malpresentation and a large lower uterine fibroid  -admit to L&D  -routine labs  -HELLP labs  -NPO bicitra  -EFM/toco  -IV hydration  -ancef  -anesthesia consult  -anticipated c/s    seen with Dr. Rambo Meade NP

## 2021-12-28 NOTE — PROVIDER CONTACT NOTE (OTHER) - ASSESSMENT
Alert and orientated X 4  Comfortable skin to skin with infant. Abd dresiing clean dry in tact  Urine yolanda in henry

## 2021-12-28 NOTE — OB PROVIDER H&P - ATTENDING COMMENTS
PT MET AT BEDSIDE. HAVING C/S AT 37+ WEKS FOR CHTN. ALSO HAS MOLLY FIBROID 9 CM ANT MIDLINE. BAY IS NOW TRANSVERSE, BACK DOWN, HEAD LUQ, FEET RUQ. IN VIEW OF MOLLY FIBROID CAN NOT DO LFT C/S. IN VIEW OF FETAL POSITION WILL NEED CLASICAL C/S. PLACENTA POST/FUNDAL. THIS WAS DISCUSSED WITH PT WHO UNDERSTANDS.    LETICIA GARCIA

## 2021-12-28 NOTE — OB RN INTRAOPERATIVE NOTE - NSSELHIDDEN_OBGYN_ALL_OB_FT
[NS_DeliveryAttending1_OBGYN_ALL_OB_FT:[id number]==],[NS_DeliveryAttending2_OBGYN_ALL_OB_FT:[id number]] [NS_DeliveryAttending1_OBGYN_ALL_OB_FT:[id number]==],[NS_DeliveryAttending2_OBGYN_ALL_OB_FT:[id number]=],[NS_DeliveryRN_OBGYN_ALL_OB_FT:[id number]] [NS_DeliveryAttending1_OBGYN_ALL_OB_FT:[id number]==],[NS_DeliveryAttending2_OBGYN_ALL_OB_FT:[id number]=],[NS_DeliveryRN_OBGYN_ALL_OB_FT:[id number]==],[NS_DeliveryAssist1_OBGYN_ALL_OB_FT:[id number]=],[NS_DeliveryAssist2_OBGYN_ALL_OB_FT:[id number]]

## 2021-12-28 NOTE — CHART NOTE - NSCHARTNOTEFT_GEN_A_CORE
Background: Pt evaluated in RR after pCS. EBL of 1000cc.  Complicated by a 10cm lower uterine segment fibroid. Pt received TXA, Hemabate, and Cytotec.      S: Pt seen and examined at beside. Pt sitting up in the stretcher. Feels well. Denies symptoms of lightheaded or dizzy sensations. Tolerating PO. Denies N/V.     O:   HR: 77 BP: 148/78 O2: 98     YESIKA: Sitting up, NAD, Alert and oriented   Abdomen: Uterus firm above the umbilicus, No active bleeding noted on pad. Appropriate vaginal bleeding.  Clean bandage in place.       Pt cleared to go to post partum floor   Karina Whitney, PGY-1

## 2021-12-28 NOTE — PROVIDER CONTACT NOTE (OTHER) - SITUATION
post op c sedtion ebl = 1000  QBL_ 930 ml  Fundus firm midline  lochia small.  Comfortable  Urine output 40 ml yolanda over 90 min

## 2021-12-28 NOTE — DISCHARGE NOTE OB - HOSPITAL COURSE
Patient admitted for  section] and had an uncomplicated  delivery.  Patient had an unremarkable postoperative course and was stable for discharge home on postoperative day 2.

## 2021-12-28 NOTE — OB RN PATIENT PROFILE - FALL HARM RISK - UNIVERSAL INTERVENTIONS
Bed in lowest position, wheels locked, appropriate side rails in place/Call bell, personal items and telephone in reach/Instruct patient to call for assistance before getting out of bed or chair/Non-slip footwear when patient is out of bed/[place] to call system/Physically safe environment - no spills, clutter or unnecessary equipment/Purposeful Proactive Rounding/Room/bathroom lighting operational, light cord in reach

## 2021-12-28 NOTE — DISCHARGE NOTE OB - PATIENT PORTAL LINK FT
You can access the FollowMyHealth Patient Portal offered by [facility] by registering at the following website: [email or web address]. By joining [facility]’s FollowMyHealth portal, you will also be able to view your health information using other applications (apps) compatible with our system.

## 2021-12-28 NOTE — OB RN DELIVERY SUMMARY - NS_SEPSISRSKCALC_OBGYN_ALL_OB_FT
'Type of intrapartum antibiotics' must be entered above.  GBS status in the 'Prenatal Lab tests/results section' on the OB RN Patient Profile must be documented.   GBS status in the 'Prenatal Lab tests/results section' on the OB RN Patient Profile must be documented.

## 2021-12-28 NOTE — OB NEONATOLOGY/PEDIATRICIAN DELIVERY SUMMARY - NSPEDSNEONOTESA_OBGYN_ALL_OB_FT
Requested to attend this primary C/S at 37 3/7 weeks for malpresentation and uterine fibroids. Mother is a 46 year old , blood type A pos, prenatal labs neg, NR and immune, GBS unknown. Maternal history of chronic hypertension on Procardia. AROM at delivery with clear fluid. Infant emerged breech, limp, apneic and cyanotic. Warmed, dried, stimulated and suctioned. PPV 20/5 21% initiated by 40 seconds of life for apnea and HR < 100, PPV x 10 seconds with spontaneous respirations noted and transitioned to room air. Apgar 4/9.

## 2021-12-28 NOTE — OB PROVIDER H&P - HISTORY OF PRESENT ILLNESS
45 y/o female  35w 3d gestation  with h/o gestational HTN, presents to [facility] for scheduled  on 21. Denies any palpitations, SOB, N/V, fever or chills.   ***COVID swab scheduled for 21***     The patient is a 45 y/o  [facility] 1/15/2022 @ 37.3 weeks admitted for primary c/s for malpresentation, transverse presentation with a lower uterine segment fibroid approx 10 cm. The patient has a h/o cHTN requiring Procardia XL 60 mg ( forget to take this morning). The patient denies LOF, VB, contx. endorses good fetal movement. The patient accepts all blood products    allergies: nkda  meds: Procardia 60 mg XL, ASA 81 mg ( last dose 2 days prior)  PNC: cHTN developed at 12 weeks, seen by Dr. Ibarra (10/21) echo/EKG WNL     Medhx: cHTN. The patient denies pulm, heme, GI, neuro  OBhx: () ruptured ectopic pregnancy requiring MTX/salpingectomy  () chem pregnancy  Sxhx: ( ) open appendectomy  () pituitary adenoma removed  () exploratory laproscopic excision of endometriomas  () laproscopic myomectomy  (2018) hysteroscopic D+C  Gynhx: pt denies cysts, abn. pap, STDs  Sochx: pt denies  Famhx: pt denies

## 2021-12-28 NOTE — OB RN PATIENT PROFILE - PRO FEEDING PLAN INFANT OB
initiation of breastfeeding/breast milk feeding formula ok/initiation of breastfeeding/breast milk feeding

## 2021-12-28 NOTE — DISCHARGE NOTE OB - CARE PROVIDER_API CALL
Allie Ricks)  Obstetrics and Gynecology  [facility] [place], [address] [place] [address] [address]  [place] [address]  Phone: [phone]  Fax: [phone]  Follow Up Time: 2 weeks

## 2021-12-28 NOTE — DISCHARGE NOTE OB - NS MD DC FALL RISK RISK
For information on Fall & Injury Prevention, visit: [email or web address] OR  [email or web address] OR  https://www.cdc.gov/steadi/patient.html

## 2021-12-28 NOTE — DISCHARGE NOTE OB - MEDICATION SUMMARY - MEDICATIONS TO TAKE
I will START or STAY ON the medications listed below when I get home from the hospital:    acetaminophen 325 mg oral tablet  -- 3 tab(s) by mouth 3 times a day, As Needed  -- Indication: For Pain    ibuprofen 600 mg oral tablet  -- 1 tab(s) by mouth every 6 hours  -- Indication: For Pain    oxyCODONE 5 mg oral tablet  -- 1 tab(s) by mouth every 3 hours, As needed, Moderate to Severe Pain (4-10)  -- Indication: For Pain    oxyCODONE 5 mg oral tablet  -- 1 tab(s) by mouth every 6 hours  -- Indication: For severe pain    NIFEdipine 60 mg oral tablet, extended release  -- 1 tab(s) by mouth once a day  -- Indication: For Hypertension

## 2021-12-28 NOTE — DISCHARGE NOTE OB - MEDICATION SUMMARY - MEDICATIONS TO STOP TAKING
I will STOP taking the medications listed below when I get home from the hospital:    Aspir 81 oral delayed release tablet  -- 1 tab(s) by mouth once a day    magnesium  -- 1 tab(s) by mouth once a day

## 2021-12-28 NOTE — OB RN DELIVERY SUMMARY - NSSELHIDDEN_OBGYN_ALL_OB_FT
[NS_DeliveryAttending1_OBGYN_ALL_OB_FT:[id number]==],[NS_DeliveryAttending2_OBGYN_ALL_OB_FT:[id number]],[NS_DeliveryRN_OBGYN_ALL_OB_FT:[id number]] [NS_DeliveryAttending1_OBGYN_ALL_OB_FT:[id number]],[NS_DeliveryAttending2_OBGYN_ALL_OB_FT:[id number]=],[NS_DeliveryRN_OBGYN_ALL_OB_FT:[id number]],[NS_DeliveryAssist2_OBGYN_ALL_OB_FT:[id number]=],[NS_DeliveryAssist1_OBGYN_ALL_OB_FT:[id number]=]

## 2021-12-28 NOTE — OB NEONATOLOGY/PEDIATRICIAN DELIVERY SUMMARY - NSCSDELIVASCHE_OBGYN_ALL_OB
Monitor fingersticks closely   Continue ISS   Consistent carbohydrate diet  Endocrine consult for hypercalcemia-no acute intervention 6/21 Scheduled Amb[place]lette

## 2021-12-28 NOTE — DISCHARGE NOTE OB - PLAN OF CARE
After discharge, please stay on pelvic rest for 6 weeks, meaning no sexual intercourse, no tampons and no douching.  No driving for 2 weeks as women can loose a lot of blood during delivery and there is a possibility of being lightheaded/fainting.  No lifting objects heavier than baby for two weeks.  Expect to have vaginal bleeding/spotting for up to six weeks.  The bleeding should get lighter and more white/light brown with time.  For bleeding soaking more than a pad an hour or passing clots greater than the size of your fist, come in to the emergency department.    Follow up in the office in 2 weeks for incision check.  Call your OBGYN for noticeable increase in redness or swelling at incision, discharge from incision, or opening of skin at incision site. Continue to take your blood pressure at least twice a day, and take your medications as prescribed.  Call your OB if your pressures are greater than 150/100, or if you experience severe or persistent headaches, visual changes, persistent nausea/vomiting, trouble breathing, chest pain, or severe abdominal pain. Please follow up with your OBGYN within 1 week to review your blood pressures.

## 2021-12-29 LAB
BASOPHILS # BLD AUTO: 0.04 K/UL — SIGNIFICANT CHANGE UP (ref 0–0.2)
BASOPHILS NFR BLD AUTO: 0.2 % — SIGNIFICANT CHANGE UP (ref 0–2)
EOSINOPHIL # BLD AUTO: 0.04 K/UL — SIGNIFICANT CHANGE UP (ref 0–0.5)
EOSINOPHIL NFR BLD AUTO: 0.2 % — SIGNIFICANT CHANGE UP (ref 0–6)
HCT VFR BLD CALC: 32 % — LOW (ref 34.5–45)
HGB BLD-MCNC: 10.8 G/DL — LOW (ref 11.5–15.5)
IMM GRANULOCYTES NFR BLD AUTO: 0.5 % — SIGNIFICANT CHANGE UP (ref 0–1.5)
LYMPHOCYTES # BLD AUTO: 11.6 % — LOW (ref 13–44)
LYMPHOCYTES # BLD AUTO: 2.02 K/UL — SIGNIFICANT CHANGE UP (ref 1–3.3)
MCHC RBC-ENTMCNC: 33.2 PG — SIGNIFICANT CHANGE UP (ref 27–34)
MCHC RBC-ENTMCNC: 33.8 GM/DL — SIGNIFICANT CHANGE UP (ref 32–36)
MCV RBC AUTO: 98.5 FL — SIGNIFICANT CHANGE UP (ref 80–100)
MONOCYTES # BLD AUTO: 1.66 K/UL — HIGH (ref 0–0.9)
MONOCYTES NFR BLD AUTO: 9.6 % — SIGNIFICANT CHANGE UP (ref 2–14)
NEUTROPHILS # BLD AUTO: 13.53 K/UL — HIGH (ref 1.8–7.4)
NEUTROPHILS NFR BLD AUTO: 77.9 % — HIGH (ref 43–77)
NRBC # BLD: 0 /100 WBCS — SIGNIFICANT CHANGE UP (ref 0–0)
PLATELET # BLD AUTO: 237 K/UL — SIGNIFICANT CHANGE UP (ref 150–400)
RBC # BLD: 3.25 M/UL — LOW (ref 3.8–5.2)
RBC # FLD: 13.1 % — SIGNIFICANT CHANGE UP (ref 10.3–14.5)
WBC # BLD: 17.37 K/UL — HIGH (ref 3.8–10.5)
WBC # FLD AUTO: 17.37 K/UL — HIGH (ref 3.8–10.5)

## 2021-12-29 RX ORDER — IBUPROFEN 200 MG
600 TABLET ORAL EVERY 6 HOURS
Refills: 0 | Status: DISCONTINUED | OUTPATIENT
Start: 2021-12-29 | End: 2021-12-30

## 2021-12-29 RX ORDER — OXYCODONE HYDROCHLORIDE 5 MG/1
2.5 TABLET ORAL ONCE
Refills: 0 | Status: DISCONTINUED | OUTPATIENT
Start: 2021-12-29 | End: 2021-12-29

## 2021-12-29 RX ADMIN — HEPARIN SODIUM 5000 UNIT(S): 5000 INJECTION INTRAVENOUS; SUBCUTANEOUS at 09:07

## 2021-12-29 RX ADMIN — HEPARIN SODIUM 5000 UNIT(S): 5000 INJECTION INTRAVENOUS; SUBCUTANEOUS at 20:18

## 2021-12-29 RX ADMIN — MAGNESIUM HYDROXIDE 30 MILLILITER(S): 400 TABLET, CHEWABLE ORAL at 06:19

## 2021-12-29 RX ADMIN — Medication 30 MILLIGRAM(S): at 09:37

## 2021-12-29 RX ADMIN — Medication 30 MILLIGRAM(S): at 02:51

## 2021-12-29 RX ADMIN — Medication 975 MILLIGRAM(S): at 11:53

## 2021-12-29 RX ADMIN — Medication 600 MILLIGRAM(S): at 23:12

## 2021-12-29 RX ADMIN — Medication 975 MILLIGRAM(S): at 17:58

## 2021-12-29 RX ADMIN — Medication 975 MILLIGRAM(S): at 00:41

## 2021-12-29 RX ADMIN — OXYCODONE HYDROCHLORIDE 2.5 MILLIGRAM(S): 5 TABLET ORAL at 22:34

## 2021-12-29 RX ADMIN — Medication 975 MILLIGRAM(S): at 12:15

## 2021-12-29 RX ADMIN — Medication 30 MILLIGRAM(S): at 09:07

## 2021-12-29 RX ADMIN — Medication 60 MILLIGRAM(S): at 14:40

## 2021-12-29 RX ADMIN — OXYCODONE HYDROCHLORIDE 2.5 MILLIGRAM(S): 5 TABLET ORAL at 21:34

## 2021-12-29 RX ADMIN — SIMETHICONE 80 MILLIGRAM(S): 80 TABLET, CHEWABLE ORAL at 20:18

## 2021-12-29 RX ADMIN — Medication 975 MILLIGRAM(S): at 17:28

## 2021-12-29 RX ADMIN — Medication 975 MILLIGRAM(S): at 06:47

## 2021-12-29 RX ADMIN — Medication 30 MILLIGRAM(S): at 03:21

## 2021-12-29 RX ADMIN — Medication 975 MILLIGRAM(S): at 06:17

## 2021-12-29 RX ADMIN — Medication 975 MILLIGRAM(S): at 00:11

## 2021-12-30 VITALS
DIASTOLIC BLOOD PRESSURE: 70 MMHG | RESPIRATION RATE: 18 BRPM | TEMPERATURE: 98 F | SYSTOLIC BLOOD PRESSURE: 111 MMHG | OXYGEN SATURATION: 98 % | HEART RATE: 78 BPM

## 2021-12-30 PROCEDURE — 85384 FIBRINOGEN ACTIVITY: CPT

## 2021-12-30 PROCEDURE — 86780 TREPONEMA PALLIDUM: CPT

## 2021-12-30 PROCEDURE — 84550 ASSAY OF BLOOD/URIC ACID: CPT

## 2021-12-30 PROCEDURE — 85025 COMPLETE CBC W/AUTO DIFF WBC: CPT

## 2021-12-30 PROCEDURE — 86901 BLOOD TYPING SEROLOGIC RH(D): CPT

## 2021-12-30 PROCEDURE — 59025 FETAL NON-STRESS TEST: CPT

## 2021-12-30 PROCEDURE — 83615 LACTATE (LD) (LDH) ENZYME: CPT

## 2021-12-30 PROCEDURE — C1765: CPT

## 2021-12-30 PROCEDURE — 59050 FETAL MONITOR W/REPORT: CPT

## 2021-12-30 PROCEDURE — 80053 COMPREHEN METABOLIC PANEL: CPT

## 2021-12-30 PROCEDURE — 86900 BLOOD TYPING SEROLOGIC ABO: CPT

## 2021-12-30 PROCEDURE — 85730 THROMBOPLASTIN TIME PARTIAL: CPT

## 2021-12-30 PROCEDURE — 86850 RBC ANTIBODY SCREEN: CPT

## 2021-12-30 PROCEDURE — 88305 TISSUE EXAM BY PATHOLOGIST: CPT

## 2021-12-30 PROCEDURE — 85610 PROTHROMBIN TIME: CPT

## 2021-12-30 RX ORDER — OXYCODONE HYDROCHLORIDE 5 MG/1
1 TABLET ORAL
Qty: 0 | Refills: 0 | DISCHARGE
Start: 2021-12-30

## 2021-12-30 RX ORDER — ASPIRIN/CALCIUM CARB/MAGNESIUM 324 MG
1 TABLET ORAL
Qty: 0 | Refills: 0 | DISCHARGE

## 2021-12-30 RX ORDER — ACETAMINOPHEN 500 MG
3 TABLET ORAL
Qty: 0 | Refills: 0 | DISCHARGE
Start: 2021-12-30

## 2021-12-30 RX ORDER — OXYCODONE HYDROCHLORIDE 5 MG/1
5 TABLET ORAL
Refills: 0 | Status: DISCONTINUED | OUTPATIENT
Start: 2021-12-30 | End: 2021-12-30

## 2021-12-30 RX ORDER — IBUPROFEN 200 MG
1 TABLET ORAL
Qty: 0 | Refills: 0 | DISCHARGE
Start: 2021-12-30

## 2021-12-30 RX ADMIN — Medication 600 MILLIGRAM(S): at 11:45

## 2021-12-30 RX ADMIN — Medication 600 MILLIGRAM(S): at 05:37

## 2021-12-30 RX ADMIN — HEPARIN SODIUM 5000 UNIT(S): 5000 INJECTION INTRAVENOUS; SUBCUTANEOUS at 08:26

## 2021-12-30 RX ADMIN — SIMETHICONE 80 MILLIGRAM(S): 80 TABLET, CHEWABLE ORAL at 05:37

## 2021-12-30 RX ADMIN — Medication 975 MILLIGRAM(S): at 08:27

## 2021-12-30 RX ADMIN — Medication 975 MILLIGRAM(S): at 00:43

## 2021-12-30 RX ADMIN — Medication 975 MILLIGRAM(S): at 09:00

## 2021-12-30 RX ADMIN — Medication 975 MILLIGRAM(S): at 01:43

## 2021-12-30 RX ADMIN — Medication 600 MILLIGRAM(S): at 00:12

## 2021-12-30 RX ADMIN — Medication 600 MILLIGRAM(S): at 11:07

## 2021-12-30 RX ADMIN — SIMETHICONE 80 MILLIGRAM(S): 80 TABLET, CHEWABLE ORAL at 13:00

## 2021-12-30 NOTE — PROGRESS NOTE ADULT - ASSESSMENT
46 y.o. G 3 P 1021      POD # 2  S/P Primary Classical C/S  for Large Fibroid in stable condition.
46y  POD # 1 S/P  primary   section, doing well

## 2021-12-30 NOTE — PROGRESS NOTE ADULT - ATTENDING COMMENTS
Note reviewed  Pt feels well  VSS AF  Uterus NT  EXT: no cords  A/P: POD 2 s/p classical  -large myoma  Lochia normal  VSS  Will d/c home and follow up next week  Edilberto Cavazos MD

## 2021-12-30 NOTE — PROGRESS NOTE ADULT - SUBJECTIVE AND OBJECTIVE BOX
Postpartum Note-  Section POD#1      Rubella IgG:         Immune                RPR:                   Negative    Blood Type:        A+    S:Patient is a  46y G  3  P  1  POD#1 S/P  primary  C/Sec  Patient w/o complaints, pain is controlled.  Pt is OOB, tolerating PO, passing flatus. Lochia WNL.   Feeding: Breastfeeding    O:  Vital Signs Last 24 Hrs  T(C): 36.4 (29 Dec 2021 05:00), Max: 37.3 (28 Dec 2021 17:00)  T(F): 97.6 (29 Dec 2021 05:00), Max: 99.1 (28 Dec 2021 17:00)  HR: 91 (29 Dec 2021 05:00) (69 - 96)  BP: 131/78 (29 Dec 2021 05:00) (119/79 - 186/89)  BP(mean): 104 (28 Dec 2021 17:29) (87 - 113)  RR: 17 (29 Dec 2021 05:00) (9 - 126)  SpO2: 99% (29 Dec 2021 05:00) (96% - 100%)  I&O's Summary    28 Dec 2021 07:01  -  29 Dec 2021 07:00  --------------------------------------------------------  IN: 3100 mL / OUT: 1690 mL / NET: 1410 mL        Gen: NAD  CV: rrr s1s2, CTABL  Abdomen: Soft, nontender, non-distended, fundus firm.  Bowel sounds x 4 quadrants  Incision: Clean, dry, and intact.  Negative erythema/edema/ecchymosis   Sub Q  Lochia WNL  Ext: PAS in place. Negative Homans B/L.  Pedal pulses palpated B/L    LABS:                          12.4   7.58  )-----------( 229      ( 28 Dec 2021 09:32 )             35.3       A/P:  46y  POD # 1 S/P  primary   section, doing well    PAST MEDICAL & SURGICAL HISTORY:  S/P appendectomy  at age 10    S/P laparoscopy  for endometriosis    Uterine fibroid- lower uterine segment approx. 10 cm    History of surgical removal of pituitary gland        Current Issues: cHTN: currently on Procardia 60 mg XL    Increase OOB  D/C IVF  Duramorph  DVT ppx  henry removed 8 hrs post-op  Regular diet  AM CBC  Routine Postpartum/Post-op care          
Day 1 of Anesthesia Pain Management Service    SUBJECTIVE:  Pain Scale Score:          [X] Refer to charted pain scores    THERAPY:    s/p neuraxial PF morphine    MEDICATIONS  (STANDING):  acetaminophen     Tablet .. 975 milliGRAM(s) Oral <User Schedule>  diphenoxylate/atropine 2 Tablet(s) Oral once  diphtheria/tetanus/pertussis (acellular) Vaccine (ADAcel) 0.5 milliLiter(s) IntraMuscular once  heparin   Injectable 5000 Unit(s) SubCutaneous every 12 hours  ibuprofen  Tablet. 600 milliGRAM(s) Oral every 6 hours  lactated ringers Bolus 500 milliLiter(s) IV Bolus once  lactated ringers. 1000 milliLiter(s) (125 mL/Hr) IV Continuous <Continuous>  NIFEdipine XL 60 milliGRAM(s) Oral daily  oxytocin Infusion 333.333 milliUNIT(s)/Min (1000 mL/Hr) IV Continuous <Continuous>    MEDICATIONS  (PRN):  dexAMETHasone  Injectable 4 milliGRAM(s) IV Push every 6 hours PRN Nausea  diphenhydrAMINE 25 milliGRAM(s) Oral every 6 hours PRN Pruritus  lanolin Ointment 1 Application(s) Topical every 6 hours PRN Sore Nipples  magnesium hydroxide Suspension 30 milliLiter(s) Oral two times a day PRN Constipation  nalbuphine Injectable 2.5 milliGRAM(s) IV Push every 6 hours PRN Pruritus  naloxone Injectable 0.1 milliGRAM(s) IV Push every 3 minutes PRN For ANY of the following changes in patient status:  A. Breaths Per Minute LESS THAN 10, B. Oxygen saturation LESS THAN 90%, C. Sedation score of 6 for Stop After: 4 Times  ondansetron Injectable 4 milliGRAM(s) IV Push every 6 hours PRN Nausea  oxyCODONE    IR 5 milliGRAM(s) Oral every 3 hours PRN Mild Pain (1 - 3)  oxyCODONE    IR 10 milliGRAM(s) Oral every 3 hours PRN Moderate Pain (4 - 6)  oxyCODONE    IR 5 milliGRAM(s) Oral every 3 hours PRN Moderate to Severe Pain (4-10)  oxyCODONE    IR 5 milliGRAM(s) Oral once PRN Moderate to Severe Pain (4-10)  simethicone 80 milliGRAM(s) Chew every 4 hours PRN Gas      OBJECTIVE:    Sedation:        	[X] Alert	[ ] Drowsy	[ ] Arousable      [ ] Asleep       [ ] Unresponsive    Side Effects:	[X] None	[ ] Nausea	[ ] Vomiting         [ ] Pruritus  		[ ] Weakness            [ ] Numbness	          [ ] Other:    Vital Signs Last 24 Hrs  T(C): 36.8 (29 Dec 2021 08:56), Max: 37.3 (28 Dec 2021 17:00)  T(F): 98.2 (29 Dec 2021 08:56), Max: 99.1 (28 Dec 2021 17:00)  HR: 85 (29 Dec 2021 08:56) (69 - 96)  BP: 111/75 (29 Dec 2021 08:56) (111/75 - 186/89)  BP(mean): 104 (28 Dec 2021 17:29) (87 - 113)  RR: 18 (29 Dec 2021 08:56) (9 - 126)  SpO2: 97% (29 Dec 2021 08:56) (96% - 100%)    ASSESSMENT/ PLAN  [X] Patient transitioned to prn analgesics  [X] Pain management per primary service, pain service to sign off   [X]Documentation and Verification of current medications
OB Attending Note      S: Pt feeling well,  no Flatus, pain controlled. ambulating, voiding, tolerating PO. No headache, blurry vision, RUQ pain.     Physical exam:    Vital Signs Last 24 Hrs  T(C): 36.9 (29 Dec 2021 13:22), Max: 36.9 (29 Dec 2021 01:00)  T(F): 98.5 (29 Dec 2021 13:22), Max: 98.5 (29 Dec 2021 01:00)  HR: 82 (29 Dec 2021 13:) (69 - 91)  BP: 112/76 (29 Dec 2021 13:) (111/75 - 135/84)  BP(mean): --  RR: 18 (29 Dec 2021 13:22) (17 - 18)  SpO2: 98% (29 Dec 2021 13:) (97% - 99%)  I&O's Summary    28 Dec 2021 07:01  -  29 Dec 2021 07:00  --------------------------------------------------------  IN: 3100 mL / OUT: 1690 mL / NET: 1410 mL        Gen: NAD  Breast: Soft, nontender, not engorged.  Abdomen: Soft, nontender, mild distension , firm uterine fundus at umbilicus.  Incision: Clean, dry, and intact   Scant Lochia  Ext: No calf tenderness    LABS:                        10.8   17.37 )-----------( 237      ( 29 Dec 2021 07:12 )             32.0                         12.4   7.58  )-----------( 229      ( 28 Dec 2021 09:32 )             35.3       21 @ 09:32      133<L>  |  103  |  10  ----------------------------<  75  4.2   |  18<L>  |  0.77        Ca    9.3      28 Dec 2021 09:32    TPro  7.0  /  Alb  3.5  /  TBili  0.2  /  DBili  x   /  AST  18  /  ALT  17  /  AlkPhos  89  21 @ 09:32              Assessment and Plan  POD #1 s/p  section for  history of myomcetomy also with fibroids and CHTN  Doing well.  Continue Ambulation/OOB/Venodynes/heparin  Cont Pain medications  Regular diet  Continue procardia  Encouraged ambulation for flatus    Leann Chavez DO          
PA POD # 2 C/S Note    Blood Type:  A+           Rubella:  Immune            RPR:  NR    Pain:  Controlled  Complaints:  Pt. admits to left abd pain lateral to incision.  Pt. is ambulating, Voiding, passing flatus, & tolerating regular diet.  Pt. rec'd intra-op TXA, Cytotec, & hemabate due to increased risk of PP hemorrphage with 10 cm size fibroid.  Lochia:  WNL    VS:  T(C): 36.6 (12-30-21 @ 05:05), Max: 37 (12-30-21 @ 01:00)  HR: 85 (12-30-21 @ 05:05) (78 - 85)  BP: 117/77 (12-30-21 @ 05:05) (111/72 - 120/76)  RR: 18 (12-30-21 @ 05:05) (18 - 18)  SpO2: 99% (12-30-21 @ 05:05) (97% - 99%)                        10.8   17.37 )-----------( 237      ( 29 Dec 2021 07:12 )             32.0     Abd:  Soft, non-distended, appropriately tender that was not increased left lateral to incision.            Fundus-firm            Incision- C/D/I-SQ  Extremities:  Edema - none                     Calf Tenderness - none    Assessment:    46 y.o. G 3 P 1021      POD # 2  S/P Primary Classical C/S  for Large Fibroid in stable condition.    PMHx significant for:  [facility] D&C, LSC Myomectomy, Exicison Pituitary Adenoma, Lapatoromy/Appendectomy, LSC Excision Endometrioma  Current Issues:  None. Pt. reassured regarding pain & re-enforced taking analgesia as prescribed.  Plan:  Increase OOB             Cont. Pain Protocol             Cont. Reg. Diet             Cont. PO Care.            Cont. DVT PPx            Cont. Procardia.    RHETT Castillo

## 2021-12-30 NOTE — PROGRESS NOTE ADULT - PROBLEM SELECTOR PLAN 1
Increase OOB  D/C IVF  Duramorph  DVT ppx  henry removed 8 hrs post-op  Regular diet  AM CBC  Routine Postpartum/Post-op care
Cont. PP/PO Care

## 2022-01-04 ENCOUNTER — NON-APPOINTMENT (OUTPATIENT)
Age: 47
End: 2022-01-04

## 2022-01-10 LAB — SURGICAL PATHOLOGY STUDY: SIGNIFICANT CHANGE UP

## 2022-06-07 NOTE — OB PROVIDER TRIAGE NOTE - INTERNATIONAL TRAVEL
No - S/p CABG x 3v LIMA-LAD, SVG-Diag, SVG-Ramus and MVR on 5/9 Dr. Coles  - Off ASA 2/2 nasal bleeding

## 2022-12-01 NOTE — OB PROVIDER TRIAGE NOTE - NS_DISPOSITION_OBGYN_ALL_OB
Other options are   Advair 250 bid  symbicort 160 bid  Dulera 100 mcg bid  Breo 100 mcg once a day         Discharge

## 2023-01-06 NOTE — OB NEONATOLOGY/PEDIATRICIAN DELIVERY SUMMARY - NSCSDELIVATYPE_OBGYN_ALL_OB
acetaminophen 325 mg oral tablet: 3 tab(s) orally every 6 hours  amoxicillin-clavulanate 875 mg-125 mg oral tablet: 1 tab(s) orally every 12 hours   calcium-vitamin D 500 mg-5 mcg (200 intl units) oral tablet: 1 tab(s) orally 3 times a day  gabapentin 100 mg oral tablet: 2 tab(s) orally every 8 hours  levothyroxine 100 mcg (0.1 mg) oral tablet: 1 tab(s) orally once a day  oxyCODONE 5 mg oral tablet: 1 tab(s) orally every 6 hours, As Needed MDD:4 tabs  oxyCODONE 5 mg oral tablet: 1 tab(s) orally every 6 hours MDD:4 tabs  QUEtiapine 50 mg oral tablet: 1 tab(s) orally once a day  SEROquel 25 mg oral tablet: 1 tab(s) orally once a day (at bedtime)  sertraline 50 mg oral tablet: 1 tab(s) orally once a day   Primary acetaminophen 325 mg oral tablet: 3 tab(s) orally every 6 hours  amoxicillin-clavulanate 875 mg-125 mg oral tablet: 1 tab(s) orally every 12 hours   calcium-vitamin D 500 mg-5 mcg (200 intl units) oral tablet: 1 tab(s) orally 3 times a day  gabapentin 100 mg oral tablet: 2 tab(s) orally every 8 hours  levothyroxine 100 mcg (0.1 mg) oral tablet: 1 tab(s) orally once a day  oxyCODONE 5 mg oral tablet: 1 tab(s) orally every 6 hours MDD:4 tabs  QUEtiapine 50 mg oral tablet: 1 tab(s) orally once a day  SEROquel 25 mg oral tablet: 1 tab(s) orally once a day (at bedtime)  sertraline 50 mg oral tablet: 1 tab(s) orally once a day

## 2023-02-17 NOTE — OB RN PATIENT PROFILE - BMI (KG/M2)
Detail Level: Zone Render In Strict Bullet Format?: No Initiate Treatment: Compounded cream bid x 2 weeks after next appt 30.8

## 2023-03-28 NOTE — OB RN PATIENT PROFILE - VDRL/RPR: DATE, OB PROFILE
Department of Anesthesiology  Postprocedure Note    Patient: Twyla Mendez  MRN: [id number]  YOB: 1959  Date of evaluation: 3/28/2023      Procedure Summary     Date: 03/28/23 Room / Location: [facility] ENDO FLOURO 1 / [facility] ENDOSCOPY    Anesthesia Start: 9801 Anesthesia Stop: 5783    Procedures:       BRONCHOSCOPY  (Chest)      BRONCHOSCOPY W/EBUS FNA ROBOTIC/MONARCH Diagnosis:       Pulmonary nodule      (Pulmonary nodule [R91.1])    Surgeons: Miriam Vann MD Responsible Provider: Jesus Zamora MD    Anesthesia Type: General ASA Status: 3          Anesthesia Type: General    Osman Phase I: Osman Score: 10    Osman Phase II:        Anesthesia Post Evaluation    Patient location during evaluation: PACU  Patient participation: complete - patient participated  Level of consciousness: awake and alert  Airway patency: patent  Nausea: well controlled. Complications: no  Cardiovascular status: acceptable. Respiratory status: acceptable  Hydration status: stable  Comments: Initially complained of R sided chest pain. After discussing with patient, she admits that this is a chronic problem she has had for a long time.  EKG unchanged
25-Jun-2021

## 2023-06-14 NOTE — OB RN TRIAGE NOTE - FALL HARM RISK - ATTEMPT OOB
Show Aperture Variable?: Yes Render Post-Care Instructions In Note?: no Number Of Freeze-Thaw Cycles: 2 freeze-thaw cycles Post-Care Instructions: I reviewed with the patient in detail post-care instructions. Patient is to wear sunprotection, and avoid picking at any of the treated lesions. Pt may apply Vaseline to crusted or scabbing areas. Duration Of Freeze Thaw-Cycle (Seconds): 0 Detail Level: Detailed Consent: The patient's consent was obtained including but not limited to risks of crusting, scabbing, blistering, scarring, darker or lighter pigmentary change, recurrence, incomplete removal and infection. No

## 2023-08-25 NOTE — OB RN DELIVERY SUMMARY - NS_NEWBORNRN1_OBGYN_ALL_OB_FT
8/25/23    [address] [place] [address]  2001    Chief Complaint   Patient presents with    Non-stress Test     NST today d/t Thrombophilia and [facility]        E[address] is a 25 y.o. female who presents today for NST because of suspected IUGR. The baseline fetal heart rate is 125. It is category I. The variability is moderate. Decelerations are absent. Accelerations are 15 beats/min or greater. Interpretation: reactive      Current Outpatient Medications   Medication Sig Dispense Refill    sertraline (ZOLOFT) 50 MG tablet Take 1 tablet by mouth daily 30 tablet 5    ferrous sulfate (IRON 325) 325 (65 Fe) MG tablet Take 1 tablet by mouth 2 times daily Every other day 180 tablet 1    Nutritional Supplements (PEDIASURE PO) Take 1 each by mouth as needed      ASPIRIN LOW DOSE 81 MG EC tablet TAKE 1 TABLET BY MOUTH EVERY DAY 90 tablet 1    calcium carbonate (TUMS) 500 MG chewable tablet Take 1 tablet by mouth as needed for Heartburn      famotidine (PEPCID) 20 MG tablet Take 1 tablet by mouth daily (Patient taking differently: Take 1 tablet by mouth daily as needed) 30 tablet 4    promethazine (PHENERGAN) 12.5 MG tablet Take 1 tablet by mouth 4 times daily as needed for Nausea 40 tablet 1    progesterone (PROMETRIUM) 200 MG CAPS capsule Take 1 capsule by mouth nightly 60 capsule 1    Prenatal Vit-Fe Fumarate-FA (PRENATAL VITAMINS) 28-0.8 MG TABS Take 1 tablet by mouth daily 90 tablet 2    cetirizine (ZYRTEC) 10 MG tablet Take 10 mg by mouth daily      fluticasone (FLONASE) 50 MCG/ACT nasal spray 1 spray by Each Nostril route daily      acetaminophen (TYLENOL) 500 MG tablet Take 1 tablet by mouth every 6 hours as needed for Pain      ondansetron (ZOFRAN-ODT) 4 MG disintegrating tablet Take 1 tablet by mouth 3 times daily as needed for Nausea or Vomiting 21 tablet 0     No current facility-administered medications for this visit.        Allergies   Allergen Reactions    Sumatriptan Hives and Nausea And Vomiting     \"whole body Giovany

## 2023-11-06 NOTE — OB RN PATIENT PROFILE - TERM DELIVERIES, OB PROFILE
Daily Note     Today's date: 2023  Patient name: Danielle Jorge  : 1953  MRN: [id number]  Referring provider: Lorri Reyna PA-C  Dx:   Encounter Diagnosis     ICD-10-CM    1. Aftercare following left shoulder joint replacement surgery  Z47.1     Z96.612                      Subjective: Patient reports he felt good after last session with additional exercises per protocol       Objective: See treatment diary below      Assessment: Tolerated treatment well and he continues to progress well however significant limitations in ER ROM noted at this time. Noted difficulty w/ wall slides and S/L ER with fatigue but no production of pain. Patient demonstrated fatigue post treatment, exhibited good technique with therapeutic exercises, and would benefit from continued PT      Plan: Continue per plan of care. Precautions: see protocol   Past Medical History:   Diagnosis Date    Arrhythmia     Chronic kidney disease     COVID 2020    CPAP (continuous positive airway pressure) dependence     Diabetes mellitus ([address])     History of echocardiogram 2017    showed EF of 50-55 percentWith moderate LVH and left ventricle diastolic dysfunction. Left atrium was moderately enlarged. Trace MR noted. History of Holter monitoring 2017    showed baseline rhythm of sinus origin with an average heart it of 61 bpm. The lowest heart rate was 49 and the highest heart rate was 10 8 bpm. There were rare single VPCs, and frequent PACs representing 3.2% of total beats. There were several episodes of sinus arrhythmias with sinus bradycardia and heart rate ranging from 40-90 bpm. No sustained dysrhythmias, or pauses noted.  The patient did not    History of transfusion 2023    platelets    Liver disease     cirhosis    Obese     Sleep apnea    SOC: 2023  FOTO: 10/16/23  POC Expiration: 2023  Daily Treatment Log:  Date 10/23/23  10/26/23 10/30/23  11/2/23 2023   Visit # [phone] (FOTO) Manual                There Exer   30' 30' 30'   [place] 3'  3'  5'  5' 5'    Supine AAROM flex w/ cane 2x10  2 x 10  10x5"  2x10 5"     Std cane abd AAROM 2x10  2 x 10  2x10  2x10  2x10   Supination and pronation  20x hammer 1#  20x hammer 1#       Standing B/L ER  2x10  2 x 10  2x10  2x10  2x10    AROM flex in supine  1x10  1 x 10  1# 2x10  1# 2x10  1# 2x10    AROM S/L abd  1x10  1 x 10  1# 2x5  1# 3x5 1# 2x10    Bicep curl   2# 2x10  2# 2 x 10  2# 2x10  3# 2x10  3# 2x10    Wall slides    2x10  2x10  2x10    S/L ER   2x5  3x5 3x5           Objective measures         HEP Updated + provided       Ther Activ                                                        NMReed   8' 8' 8'   Scap depr/elev        Scap rows tubing Grn tube 2x10  Grn tube 2 x 10  Blue tube 2x10  Blue tube 2x10 Blue tube 2x10   B shldr ext to hip w/ tube Red tube 2x10  Red tube 2 x 10  Grn tube 2x10 Grn tube 2x10  Grn tube 2x10   Iso's ball vs wall Flex/abd/ext 2x10 5" ea Flex/abd/ext 2 x 10 5' ea. Modalities                                HEP:   Access Code: [email or web address]  URL: [email or web address]  Date: 10/23/2023  Prepared by: Juan Valerioman    Exercises  - Seated Elbow Flexion and Extension AROM  - 1-2 x daily - 7 x weekly - 1-2 sets - 10 reps  - Standing Shoulder Abduction ROM with Dowel  - 1 x daily - 7 x weekly - 2 sets - 10 reps  - Shoulder External Rotation and Scapular Retraction  - 1 x daily - 7 x weekly - 2 sets - 10 reps  - Supine Shoulder Flexion Extension AAROM with Dowel  - 1 x daily - 7 x weekly - 2 sets - 10 reps  - Isometric Shoulder Flexion at Wall  - 1 x daily - 7 x weekly - 2 sets - 10 reps 0

## 2024-02-21 ENCOUNTER — OUTPATIENT (OUTPATIENT)
Dept: OUTPATIENT SERVICES | Facility: HOSPITAL | Age: 49
LOS: 1 days | End: 2024-02-21
Payer: COMMERCIAL

## 2024-02-21 VITALS
SYSTOLIC BLOOD PRESSURE: 118 MMHG | WEIGHT: 179.9 LBS | RESPIRATION RATE: 14 BRPM | OXYGEN SATURATION: 98 % | HEART RATE: 61 BPM | DIASTOLIC BLOOD PRESSURE: 68 MMHG | HEIGHT: 63.75 IN | TEMPERATURE: 98 F

## 2024-02-21 DIAGNOSIS — Z87.59 PERSONAL HISTORY OF OTHER COMPLICATIONS OF PREGNANCY, CHILDBIRTH AND THE PUERPERIUM: Chronic | ICD-10-CM

## 2024-02-21 DIAGNOSIS — Z86.018 PERSONAL HISTORY OF OTHER BENIGN NEOPLASM: Chronic | ICD-10-CM

## 2024-02-21 DIAGNOSIS — Z98.890 OTHER SPECIFIED POSTPROCEDURAL STATES: Chronic | ICD-10-CM

## 2024-02-21 DIAGNOSIS — M65.4 RADIAL STYLOID TENOSYNOVITIS [DE QUERVAIN]: ICD-10-CM

## 2024-02-21 DIAGNOSIS — Z01.818 ENCOUNTER FOR OTHER PREPROCEDURAL EXAMINATION: ICD-10-CM

## 2024-02-21 PROCEDURE — 93005 ELECTROCARDIOGRAM TRACING: CPT

## 2024-02-21 PROCEDURE — 93010 ELECTROCARDIOGRAM REPORT: CPT

## 2024-02-21 PROCEDURE — G0463: CPT

## 2024-02-21 NOTE — H&P PST ADULT - NSICDXPASTMEDICALHX_GEN_ALL_CORE_FT
PAST MEDICAL HISTORY:  COVID-19 vaccine series completed     Dyslipidemia     History of COVID-19     History of female infertility     History of gastroesophageal reflux (GERD)     History of headache     History of pituitary tumor     History of secondary hyperprolactinemia due to prolactin-secreting tumors     Mild anemia     Obesity, Class I, BMI 30-34.9     Preeclampsia     Radial styloid tenosynovitis of right hand     Sinus headache     Stuffy and runny nose

## 2024-02-21 NOTE — H&P PST ADULT - HISTORY OF PRESENT ILLNESS
49 yo female presents with 1 year history of right hand decreased strength and decreased ROM. She also reports pain in the hand only with use and then 6-7/10. She received 3 cortisone injections all with good relief. Last injection 11/23 and states that over the last few days pain is starting to recur. Denies using any current pain relief measures. She has a history of a benign pituitary tumor excision after high prolactin levels were found. Post-op she was prescribed cabergoline and stayed on it until 2021 when she became pregnant. She stayed off the medication from 2021 until October 2023 after prolactin levels became elevated again. She now reports increasing headaches and consulted with the neurosurgeon who did the surgery and he recommended that she have an MRI done to rule out another tumor. She has not had an MRI done since 2018. She scheduled it for 3/11/24 after surgery and today was instructed to have it done prior to the surgery. The MRI appointment was rescheduled together for 2/27/24.

## 2024-02-21 NOTE — H&P PST ADULT - PROBLEM SELECTOR PLAN 1
right dequervains tenosynovitis release. medical and neurosurgical clearances requested. obtain MRI. surgical wash instructions reviewed and verbalized understanding. NPO after midnight

## 2024-02-21 NOTE — H&P PST ADULT - COMMENTS
history of covid June 2022, mild symptoms and no treatment and August 2023 mild symptoms and no treatment  denies any current cold or flu like symptoms, including fever, cough, sinus congestion, body aches or chills

## 2024-02-21 NOTE — H&P PST ADULT - NSICDXFAMILYHX_GEN_ALL_CORE_FT
FAMILY HISTORY:  Father  Still living? No  Family history of Alzheimer's disease, Age at diagnosis: Age Unknown  Family history of hypertension, Age at diagnosis: Age Unknown    Mother  Still living? No  Family history of blood disorder, Age at diagnosis: Age Unknown    Sibling  Still living? Yes, Estimated age: 21-30  Family history of schizophrenia, Age at diagnosis: Age Unknown

## 2024-02-21 NOTE — H&P PST ADULT - NSICDXPASTSURGICALHX_GEN_ALL_CORE_FT
PAST SURGICAL HISTORY:  H/O dilation and curettage     History of benign pituitary tumor     History of ectopic pregnancy     History of laparoscopy     History of myomectomy     [place] product of in vitro fertilization (IVF) pregnancy

## 2024-02-21 NOTE — H&P PST ADULT - NSANTHOSAYNRD_GEN_A_CORE
neck 16 inches/No. GIOVANNI screening performed.  STOP BANG Legend: 0-2 = LOW Risk; 3-4 = INTERMEDIATE Risk; 5-8 = HIGH Risk

## 2024-02-21 NOTE — H&P PST ADULT - LYMPHATIC
MEDICATIONS  (STANDING):  influenza   Vaccine 0.5 milliLiter(s) IntraMuscular once  venlafaxine XR 75 milliGRAM(s) Oral daily    MEDICATIONS  (PRN):  ibuprofen  Tablet. 600 milliGRAM(s) Oral every 6 hours PRN Mild Pain (1 - 3), Moderate Pain (4 - 6)  LORazepam     Tablet 1 milliGRAM(s) Oral every 6 hours PRN Agitation  LORazepam   Injectable 1 milliGRAM(s) IntraMuscular once PRN severe agitation  melatonin. 3 milliGRAM(s) Oral at bedtime PRN Insomnia   No lymphadedenopathy

## 2024-03-26 ENCOUNTER — APPOINTMENT (OUTPATIENT)
Dept: PULMONOLOGY | Facility: CLINIC | Age: 49
End: 2024-03-26
Payer: MEDICARE

## 2024-03-26 VITALS
HEIGHT: 63.78 IN | SYSTOLIC BLOOD PRESSURE: 117 MMHG | BODY MASS INDEX: 30.25 KG/M2 | DIASTOLIC BLOOD PRESSURE: 72 MMHG | WEIGHT: 175 LBS | OXYGEN SATURATION: 98 % | HEART RATE: 73 BPM

## 2024-03-26 DIAGNOSIS — E66.9 OVERWEIGHT: ICD-10-CM

## 2024-03-26 DIAGNOSIS — D35.2 BENIGN NEOPLASM OF PITUITARY GLAND: ICD-10-CM

## 2024-03-26 DIAGNOSIS — I27.20 PULMONARY HYPERTENSION, UNSPECIFIED: ICD-10-CM

## 2024-03-26 DIAGNOSIS — E66.3 OVERWEIGHT: ICD-10-CM

## 2024-03-26 DIAGNOSIS — R06.83 SNORING: ICD-10-CM

## 2024-03-26 PROCEDURE — 94726 PLETHYSMOGRAPHY LUNG VOLUMES: CPT

## 2024-03-26 PROCEDURE — 99205 OFFICE O/P NEW HI 60 MIN: CPT | Mod: 25

## 2024-03-26 PROCEDURE — 36415 COLL VENOUS BLD VENIPUNCTURE: CPT

## 2024-03-26 PROCEDURE — ZZZZZ: CPT

## 2024-03-26 PROCEDURE — 94060 EVALUATION OF WHEEZING: CPT

## 2024-03-26 PROCEDURE — 94729 DIFFUSING CAPACITY: CPT

## 2024-03-26 RX ORDER — NIFEDIPINE 60 MG/1
60 TABLET, EXTENDED RELEASE ORAL DAILY
Qty: 90 | Refills: 0 | Status: DISCONTINUED | COMMUNITY
Start: 2021-07-25 | End: 2024-03-26

## 2024-03-26 RX ORDER — ASPIRIN ENTERIC COATED TABLETS 81 MG 81 MG/1
81 TABLET, DELAYED RELEASE ORAL
Refills: 0 | Status: DISCONTINUED | COMMUNITY
Start: 2021-07-25 | End: 2024-03-26

## 2024-03-26 NOTE — PHYSICAL EXAM
[No Acute Distress] : no acute distress [III] : Mallampati Class: III [Normal Appearance] : normal appearance [No Neck Mass] : no neck mass [Normal Rate/Rhythm] : normal rate/rhythm [Normal S1, S2] : normal s1, s2 [No Resp Distress] : no resp distress [No Murmurs] : no murmurs [No Abnormalities] : no abnormalities [Clear to Auscultation Bilaterally] : clear to auscultation bilaterally [Normal Gait] : normal gait [Benign] : benign [No Edema] : no edema [No Clubbing] : no clubbing [No Cyanosis] : no cyanosis [Normal Color/ Pigmentation] : normal color/ pigmentation [FROM] : FROM [No Focal Deficits] : no focal deficits [Oriented x3] : oriented x3 [Normal Affect] : normal affect

## 2024-03-26 NOTE — END OF VISIT
[FreeTextEntry3] :   I, Dr. Sushant Bustamante  personally performed the evaluation and management (E/M) services for this established patient who presents today with (a) new problem(s)/exacerbation of (an) existing condition(s). That E/M includes conducting the clinically appropriate interval history &/or exam, assessing all new/exacerbated conditions, and establishing a new plan of care. Today, my DINA, Yue Bryant NP, , was here to observe my evaluation and management service for this new problem/exacerbated condition and follow the plan of care established by me going forward. [Time Spent: ___ minutes] : I have spent [unfilled] minutes of time on the encounter.

## 2024-03-26 NOTE — HISTORY OF PRESENT ILLNESS
[Never] : never [TextBox_4] : This letter  is regarding your patient  who  attended pulmonary out patient office today.  I have reviewed  patient's  past history, social history, family history and medication list. I also  reviewed nurse practitioners/ and fellows  notes and assessment and agree with it.   The patient was referred by [facility]/Dr Oropeza for possible pulm htn  50 yo w/ h/o prolactinoma w/cranial surgery 2010. 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 Questionable history of Raynaud's phenomenon. No h/o significant weight loss in last few months. No history of liver dysfunction , collagen vascular disorder or chronic thromboembolic disease. I would classify the patient's dyspnea as WHO  FUNCTIONAL CLASS II--------  ----Echo  date------dr taveras---was told she has exercise-induced pulmonary hypertension-----reports still awaited ----Pft date----3/2024 normal lung volumes----- ----Ct scan date------- pending ----Cath date------------ pending

## 2024-03-26 NOTE — DISCUSSION/SUMMARY
[FreeTextEntry1] : ---Assessment plan----------The patient has been referred here for further opinion regarding pulmonary problem,  48 yo w/ h/o prolactinoma w/cranial surgery 2010. Long history of infertility and possible GIOVANNI. referred for PH workup-----as per the patient she was told she has exercise-induced pulmonary hypertension------- no history of collagen vascular disorder-------- she does have features and history suggestive of sleep apnea  1) secondary pulm hypertension related to exercise -----get CTA chest, obtain blood work 6-minute walk test, cardiac MRI ,,,,,she may ultimately require right heart catheterization 2) rx given for PH labs 3) will get records from [facility] 4) HST vs getting previous sleep study results to r/o GIOVANNI 5) f/u next week  Thanks for allowing  me to participate  in the care of this patient.  Patient at this time  will follow  the above mentioned recommendations and return back for follow up visit. If you have any questions  I can be reached  at # [phone] (office).  Sushant Bustamante MD, [facility]  Pulmonary, Critical Care and Sleep Medicine

## 2024-04-01 ENCOUNTER — NON-APPOINTMENT (OUTPATIENT)
Age: 49
End: 2024-04-01

## 2024-04-01 NOTE — PHYSICAL EXAM
[No Acute Distress] : no acute distress [III] : Mallampati Class: III [Normal Appearance] : normal appearance [Normal Rate/Rhythm] : normal rate/rhythm [No Neck Mass] : no neck mass [No Murmurs] : no murmurs [Normal S1, S2] : normal s1, s2 [No Resp Distress] : no resp distress [Clear to Auscultation Bilaterally] : clear to auscultation bilaterally [No Abnormalities] : no abnormalities [Normal Gait] : normal gait [Benign] : benign [No Clubbing] : no clubbing [No Edema] : no edema [No Cyanosis] : no cyanosis [FROM] : FROM [Normal Color/ Pigmentation] : normal color/ pigmentation [No Focal Deficits] : no focal deficits [Oriented x3] : oriented x3 [Normal Affect] : normal affect

## 2024-04-02 ENCOUNTER — LABORATORY RESULT (OUTPATIENT)
Age: 49
End: 2024-04-02

## 2024-04-03 ENCOUNTER — TRANSCRIPTION ENCOUNTER (OUTPATIENT)
Age: 49
End: 2024-04-03

## 2024-04-03 LAB
25(OH)D3 SERPL-MCNC: 20.4 NG/ML
A1AT SERPL-MCNC: 135 MG/DL
ALBUMIN SERPL ELPH-MCNC: 4 G/DL
ALP BLD-CCNC: 105 U/L
ALT SERPL-CCNC: 15 U/L
ANION GAP SERPL CALC-SCNC: 12 MMOL/L
AST SERPL-CCNC: 19 U/L
BILIRUB SERPL-MCNC: 0.3 MG/DL
BUN SERPL-MCNC: 16 MG/DL
CALCIUM SERPL-MCNC: 9.6 MG/DL
CALCIUM SERPL-MCNC: 9.6 MG/DL
CARDIOLIPIN AB SER IA-ACNC: NEGATIVE
CHLORIDE SERPL-SCNC: 106 MMOL/L
CO2 SERPL-SCNC: 23 MMOL/L
CREAT SERPL-MCNC: 0.87 MG/DL
CRP SERPL-MCNC: <3 MG/L
EGFR: 82 ML/MIN/1.73M2
ERYTHROCYTE [SEDIMENTATION RATE] IN BLOOD BY WESTERGREN METHOD: 26 MM/HR
FOLATE RBC-MCNC: 1224 NG/ML
GLUCOSE SERPL-MCNC: 84 MG/DL
HCT VFR BLD CALC: 40.1 %
INR PPP: 0.95 RATIO
NT-PROBNP SERPL-MCNC: 142 PG/ML
PARATHYROID HORMONE INTACT: 62 PG/ML
POTASSIUM SERPL-SCNC: 4.5 MMOL/L
PROT SERPL-MCNC: 7.1 G/DL
PT BLD: 10.8 SEC
RHEUMATOID FACT SER QL: 11 IU/ML
SODIUM SERPL-SCNC: 141 MMOL/L
TSH SERPL-ACNC: 1.37 UIU/ML
URATE SERPL-MCNC: 3.2 MG/DL
VIT B12 SERPL-MCNC: 816 PG/ML

## 2024-04-04 ENCOUNTER — APPOINTMENT (OUTPATIENT)
Dept: PULMONOLOGY | Facility: CLINIC | Age: 49
End: 2024-04-04
Payer: MEDICARE

## 2024-04-04 VITALS
WEIGHT: 181 LBS | BODY MASS INDEX: 32.07 KG/M2 | OXYGEN SATURATION: 97 % | SYSTOLIC BLOOD PRESSURE: 119 MMHG | HEIGHT: 63 IN | DIASTOLIC BLOOD PRESSURE: 80 MMHG | HEART RATE: 66 BPM

## 2024-04-04 LAB
CCP AB SER IA-ACNC: <8 UNITS
ENA SCL70 IGG SER IA-ACNC: <0.2 AL
ENA SS-A AB SER IA-ACNC: <0.2 AL
ENA SS-B AB SER IA-ACNC: <0.2 AL
RF+CCP IGG SER-IMP: NEGATIVE

## 2024-04-04 PROCEDURE — 99215 OFFICE O/P EST HI 40 MIN: CPT

## 2024-04-04 NOTE — DISCUSSION/SUMMARY
[FreeTextEntry1] : ---Assessment plan----------The patient has been referred here for further opinion regarding pulmonary problem,  50 yo w/ h/o prolactinoma w/cranial surgery 2010. Long history of infertility and possible GIOVANNI. referred for PH workup-----as per the patient she was told she has exercise-induced pulmonary hypertension------- no history of collagen vascular disorder-------- she does have features and history suggestive of sleep apnea  1) secondary pulm hypertension related to exercise -----get CTA chest, obtain blood work 6-minute walk test, cardiac MRI ,,,CPET---- ,,she may ultimately require right heart catheterization 2)repeat HST given GIOVANNI 3) will get records from [facility] 4) needs cpet 5) f/u in july 2024  Thanks for allowing  me to participate  in the care of this patient.  Patient at this time  will follow  the above mentioned recommendations and return back for follow up visit. If you have any questions  I can be reached  at # [phone] (office).  Sushant Bustamante MD, [facility]  Pulmonary, Critical Care and Sleep Medicine

## 2024-04-04 NOTE — HISTORY OF PRESENT ILLNESS
[Never] : never [TextBox_4] : This letter  is regarding your patient  who  attended pulmonary out patient office today.  I have reviewed  patient's  past history, social history, family history and medication list. I also  reviewed nurse practitioners/ and fellows  notes and assessment and agree with it.   The patient was referred by [facility]/Dr Oropeza for possible pulm htn  50 yo w/ h/o prolactinoma w/cranial surgery 2010. 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 Questionable history of Raynaud's phenomenon. No h/o significant weight loss in last few months. No history of liver dysfunction , collagen vascular disorder or chronic thromboembolic disease. I would classify the patient's dyspnea as WHO  FUNCTIONAL CLASS II--------  ----Echo  date------dr taveras---was told she has exercise-induced pulmonary hypertension-----reports still awaited ----Pft date----3/2024 normal lung volumes----- ----Ct scan date------- pending ----Cath date------------ pending   4/2024 features of giovanni- previous sleep study  approx 3 years ago w/mild GIOVANNI no other complaints today

## 2024-04-05 LAB — ANACR T: NEGATIVE

## 2024-04-30 ENCOUNTER — OUTPATIENT (OUTPATIENT)
Dept: OUTPATIENT SERVICES | Facility: HOSPITAL | Age: 49
LOS: 1 days | End: 2024-04-30
Payer: COMMERCIAL

## 2024-04-30 ENCOUNTER — APPOINTMENT (OUTPATIENT)
Dept: CT IMAGING | Facility: CLINIC | Age: 49
End: 2024-04-30
Payer: COMMERCIAL

## 2024-04-30 DIAGNOSIS — Z98.890 OTHER SPECIFIED POSTPROCEDURAL STATES: Chronic | ICD-10-CM

## 2024-04-30 DIAGNOSIS — D35.2 BENIGN NEOPLASM OF PITUITARY GLAND: ICD-10-CM

## 2024-04-30 DIAGNOSIS — Z98.89 OTHER SPECIFIED POSTPROCEDURAL STATES: Chronic | ICD-10-CM

## 2024-04-30 DIAGNOSIS — D25.9 LEIOMYOMA OF UTERUS, UNSPECIFIED: Chronic | ICD-10-CM

## 2024-04-30 DIAGNOSIS — Z90.49 ACQUIRED ABSENCE OF OTHER SPECIFIED PARTS OF DIGESTIVE TRACT: Chronic | ICD-10-CM

## 2024-04-30 DIAGNOSIS — Z86.018 PERSONAL HISTORY OF OTHER BENIGN NEOPLASM: Chronic | ICD-10-CM

## 2024-04-30 DIAGNOSIS — Z87.59 PERSONAL HISTORY OF OTHER COMPLICATIONS OF PREGNANCY, CHILDBIRTH AND THE PUERPERIUM: Chronic | ICD-10-CM

## 2024-04-30 PROCEDURE — 71275 CT ANGIOGRAPHY CHEST: CPT

## 2024-04-30 PROCEDURE — 71275 CT ANGIOGRAPHY CHEST: CPT | Mod: 26

## 2024-05-01 RX ORDER — CHLORPHEN/PSEUDOEPH/IBUPROFEN 2-30-200MG
1 TABLET ORAL
Refills: 0 | DISCHARGE

## 2024-05-01 RX ORDER — ACETAMINOPHEN 500 MG
2 TABLET ORAL
Refills: 0 | DISCHARGE

## 2024-05-01 RX ORDER — CABERGOLINE 0.5 MG/1
1 TABLET ORAL
Refills: 0 | DISCHARGE

## 2024-05-01 RX ORDER — ROSUVASTATIN CALCIUM 5 MG/1
1 TABLET ORAL
Refills: 0 | DISCHARGE

## 2024-06-17 ENCOUNTER — NON-APPOINTMENT (OUTPATIENT)
Age: 49
End: 2024-06-17

## 2024-06-27 ENCOUNTER — APPOINTMENT (OUTPATIENT)
Dept: PULMONOLOGY | Facility: CLINIC | Age: 49
End: 2024-06-27

## 2024-08-15 ENCOUNTER — APPOINTMENT (OUTPATIENT)
Dept: PULMONOLOGY | Facility: CLINIC | Age: 49
End: 2024-08-15

## 2024-08-15 VITALS
TEMPERATURE: 97.6 F | SYSTOLIC BLOOD PRESSURE: 110 MMHG | HEART RATE: 67 BPM | WEIGHT: 180 LBS | RESPIRATION RATE: 16 BRPM | BODY MASS INDEX: 31.89 KG/M2 | OXYGEN SATURATION: 95 % | DIASTOLIC BLOOD PRESSURE: 78 MMHG | HEIGHT: 63 IN

## 2024-08-15 PROCEDURE — 99204 OFFICE O/P NEW MOD 45 MIN: CPT

## 2024-08-15 PROCEDURE — 99214 OFFICE O/P EST MOD 30 MIN: CPT

## 2024-08-15 NOTE — COUNSELING
[Benefits of weight loss discussed] : Benefits of weight loss discussed [Encouraged to increase physical activity] : Encouraged to increase physical activity [Patient Non-adherent to care plan] : Patient non-adherent to care plan

## 2024-08-15 NOTE — HISTORY OF PRESENT ILLNESS
[Never] : never [TextBox_4] : This letter  is regarding your patient  who  attended pulmonary out patient office today.  I have reviewed  patient's  past history, social history, family history and medication list. I also  reviewed nurse practitioners/ and fellows  notes and assessment and agree with it.   The patient was referred by [facility]/Dr Oropeza for possible pulm htn  50 yo w/ h/o prolactinoma w/cranial surgery 2010. 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 Questionable history of Raynaud's phenomenon. No h/o significant weight loss in last few months. No history of liver dysfunction , collagen vascular disorder or chronic thromboembolic disease. I would classify the patient's dyspnea as WHO  FUNCTIONAL CLASS II--------  ----Echo  date------dr taveras---was told she has exercise-induced pulmonary hypertension-----reports still awaited ----Pft date----3/2024 normal lung volumes----- ----Ct scan date------- pending ----Cath date------------ pending   4/2024 features of giovanni- previous sleep study  approx 3 years ago w/mild GIOVANNI no other complaints today  4/2024 CTA chest neg for PE  8/2024 initially referred by [facility] for exercise induced Pulm HTN Pt remains asymptomatic Missed appts for cards and HST

## 2024-08-15 NOTE — DISCUSSION/SUMMARY
[FreeTextEntry1] : ---Assessment plan----------The patient has been referred here for further opinion regarding pulmonary problem,  50 yo w/ h/o prolactinoma w/cranial surgery 2010. Long history of infertility and possible GIOVANNI. referred for PH workup-----as per the patient she was told she has exercise-induced pulmonary hypertension------- no history of collagen vascular disorder-------- she does have features and history suggestive of sleep apnea  1)?? secondary pulm hypertension related to exercise - obtain  MRI ,,,CPET---- ,,she may ultimately require right heart catheterization (pt NOT in favor of this) 2)repeat HST given GIOVANNI 3) f/u with cards- annual echo 4) f/u in 6m  Thanks for allowing  me to participate  in the care of this patient.  Patient at this time  will follow  the above mentioned recommendations and return back for follow up visit. If you have any questions  I can be reached  at # [phone] (office).  Sushant Bustamante MD, [facility]  Pulmonary, Critical Care and Sleep Medicine

## 2024-08-15 NOTE — HISTORY OF PRESENT ILLNESS
[Never] : never [TextBox_4] : This letter  is regarding your patient  who  attended pulmonary out patient office today.  I have reviewed  patient's  past history, social history, family history and medication list. I also  reviewed nurse practitioners/ and fellows  notes and assessment and agree with it.   The patient was referred by [facility]/Dr Oropeza for possible pulm htn  50 yo w/ h/o prolactinoma w/cranial surgery 2010. Long history of infertility and possible GIOVANNI Getting PST at [facility] for hand surgery and was found to have abn EKG- referred to cardiology whose echo showed PH (report unavailable) Pt is asymptomatic, works as a teacher Reports past h/o diet pill use x 6-7m while in her 20s Also reports heavy snoring- had sleep study in 2022- never followed up with results  ------No history of , fever, chills , rigors, chest pain, or hemoptysis. Questionable history of Raynaud's phenomenon. No h/o significant weight loss in last few months. No history of liver dysfunction , collagen vascular disorder or chronic thromboembolic disease. I would classify the patient's dyspnea as WHO  FUNCTIONAL CLASS II--------  ----Echo  date------dr taveras---was told she has exercise-induced pulmonary hypertension-----reports still awaited ----Pft date----3/2024 normal lung volumes----- ----Ct scan date------- pending ----Cath date------------ pending   4/2024 features of giovanni- previous sleep study  approx 3 years ago w/mild GIOVANNI no other complaints today  4/2024 CTA chest neg for PE  8/2024 initially referred by [facility] for exercise induced Pulm HTN Pt remains asymptomatic Missed appts for cards and HST

## 2024-08-15 NOTE — DISCUSSION/SUMMARY
[FreeTextEntry1] : ---Assessment plan----------The patient has been referred here for further opinion regarding pulmonary problem,  48 yo w/ h/o prolactinoma w/cranial surgery 2010. Long history of infertility and possible GIOVANNI. referred for PH workup-----as per the patient she was told she has exercise-induced pulmonary hypertension------- no history of collagen vascular disorder-------- she does have features and history suggestive of sleep apnea  1)?? secondary pulm hypertension related to exercise - obtain  MRI ,,,CPET---- ,,she may ultimately require right heart catheterization (pt NOT in favor of this) 2)repeat HST given GIOVANNI 3) f/u with cards- annual echo 4) f/u in 6m  Thanks for allowing  me to participate  in the care of this patient.  Patient at this time  will follow  the above mentioned recommendations and return back for follow up visit. If you have any questions  I can be reached  at # [phone] (office).  Sushant Bustamante MD, [facility]  Pulmonary, Critical Care and Sleep Medicine

## 2024-08-22 PROBLEM — R51.9 HEADACHE, UNSPECIFIED: Chronic | Status: ACTIVE | Noted: 2024-02-21

## 2024-08-22 PROBLEM — O14.90 UNSPECIFIED PRE-ECLAMPSIA, UNSPECIFIED TRIMESTER: Chronic | Status: ACTIVE | Noted: 2024-02-21

## 2024-08-22 PROBLEM — M65.4 RADIAL STYLOID TENOSYNOVITIS [DE QUERVAIN]: Chronic | Status: ACTIVE | Noted: 2024-02-21

## 2024-08-22 PROBLEM — Z87.898 PERSONAL HISTORY OF OTHER SPECIFIED CONDITIONS: Chronic | Status: ACTIVE | Noted: 2024-02-21

## 2024-08-22 PROBLEM — E66.9 OBESITY, UNSPECIFIED: Chronic | Status: ACTIVE | Noted: 2024-02-21

## 2024-08-22 PROBLEM — Z86.16 PERSONAL HISTORY OF COVID-19: Chronic | Status: ACTIVE | Noted: 2024-02-21

## 2024-08-22 PROBLEM — Z86.39 PERSONAL HISTORY OF OTHER ENDOCRINE, NUTRITIONAL AND METABOLIC DISEASE: Chronic | Status: ACTIVE | Noted: 2024-02-21

## 2024-08-22 PROBLEM — Z87.42 PERSONAL HISTORY OF OTHER DISEASES OF THE FEMALE GENITAL TRACT: Chronic | Status: ACTIVE | Noted: 2024-02-21

## 2024-08-22 PROBLEM — E78.5 HYPERLIPIDEMIA, UNSPECIFIED: Chronic | Status: ACTIVE | Noted: 2024-02-21

## 2024-08-22 PROBLEM — Z92.29 PERSONAL HISTORY OF OTHER DRUG THERAPY: Chronic | Status: ACTIVE | Noted: 2024-02-21

## 2024-08-22 PROBLEM — Z87.19 PERSONAL HISTORY OF OTHER DISEASES OF THE DIGESTIVE SYSTEM: Chronic | Status: ACTIVE | Noted: 2024-02-21

## 2024-08-22 PROBLEM — J34.89 OTHER SPECIFIED DISORDERS OF NOSE AND NASAL SINUSES: Chronic | Status: ACTIVE | Noted: 2024-02-21

## 2024-08-22 PROBLEM — D64.9 ANEMIA, UNSPECIFIED: Chronic | Status: ACTIVE | Noted: 2024-02-21

## 2024-09-16 NOTE — OB PROVIDER DELIVERY SUMMARY - NSCOUNTCORRECT_OBGYN_ALL_OB
I will SWITCH the dose or number of times a day I take the medications listed below when I get home from the hospital:  None Laps, needles and instruments count was reported as correct.

## 2024-11-27 ENCOUNTER — APPOINTMENT (OUTPATIENT)
Dept: MRI IMAGING | Facility: CLINIC | Age: 49
End: 2024-11-27

## 2024-12-24 PROBLEM — F10.90 ALCOHOL USE: Status: ACTIVE | Noted: 2017-10-05

## 2025-07-30 ENCOUNTER — EMERGENCY (EMERGENCY)
Facility: HOSPITAL | Age: 50
LOS: 1 days | End: 2025-07-30
Attending: EMERGENCY MEDICINE
Payer: COMMERCIAL

## 2025-07-30 VITALS
TEMPERATURE: 98 F | HEART RATE: 72 BPM | SYSTOLIC BLOOD PRESSURE: 141 MMHG | HEIGHT: 63 IN | WEIGHT: 179.9 LBS | DIASTOLIC BLOOD PRESSURE: 86 MMHG | RESPIRATION RATE: 18 BRPM | OXYGEN SATURATION: 98 %

## 2025-07-30 DIAGNOSIS — Z90.49 ACQUIRED ABSENCE OF OTHER SPECIFIED PARTS OF DIGESTIVE TRACT: Chronic | ICD-10-CM

## 2025-07-30 DIAGNOSIS — Z98.890 OTHER SPECIFIED POSTPROCEDURAL STATES: Chronic | ICD-10-CM

## 2025-07-30 DIAGNOSIS — Z98.89 OTHER SPECIFIED POSTPROCEDURAL STATES: Chronic | ICD-10-CM

## 2025-07-30 DIAGNOSIS — D25.9 LEIOMYOMA OF UTERUS, UNSPECIFIED: Chronic | ICD-10-CM

## 2025-07-30 DIAGNOSIS — Z87.59 PERSONAL HISTORY OF OTHER COMPLICATIONS OF PREGNANCY, CHILDBIRTH AND THE PUERPERIUM: Chronic | ICD-10-CM

## 2025-07-30 DIAGNOSIS — Z86.018 PERSONAL HISTORY OF OTHER BENIGN NEOPLASM: Chronic | ICD-10-CM

## 2025-07-30 LAB
ALBUMIN SERPL ELPH-MCNC: 4.2 G/DL — SIGNIFICANT CHANGE UP (ref 3.3–5)
ALBUMIN SERPL ELPH-MCNC: 4.4 G/DL — SIGNIFICANT CHANGE UP (ref 3.3–5)
ALP SERPL-CCNC: 108 U/L — SIGNIFICANT CHANGE UP (ref 40–120)
ALP SERPL-CCNC: 99 U/L — SIGNIFICANT CHANGE UP (ref 40–120)
ALT FLD-CCNC: 13 U/L — SIGNIFICANT CHANGE UP (ref 10–45)
ALT FLD-CCNC: <5 U/L — LOW (ref 10–45)
ANION GAP SERPL CALC-SCNC: 12 MMOL/L — SIGNIFICANT CHANGE UP (ref 5–17)
ANION GAP SERPL CALC-SCNC: 17 MMOL/L — SIGNIFICANT CHANGE UP (ref 5–17)
AST SERPL-CCNC: 17 U/L — SIGNIFICANT CHANGE UP (ref 10–40)
AST SERPL-CCNC: <46 U/L — HIGH (ref 10–40)
BASOPHILS # BLD AUTO: 0.03 K/UL — SIGNIFICANT CHANGE UP (ref 0–0.2)
BASOPHILS NFR BLD AUTO: 0.4 % — SIGNIFICANT CHANGE UP (ref 0–2)
BILIRUB SERPL-MCNC: 0.1 MG/DL — LOW (ref 0.2–1.2)
BILIRUB SERPL-MCNC: 0.2 MG/DL — SIGNIFICANT CHANGE UP (ref 0.2–1.2)
BUN SERPL-MCNC: 16 MG/DL — SIGNIFICANT CHANGE UP (ref 7–23)
BUN SERPL-MCNC: 20 MG/DL — SIGNIFICANT CHANGE UP (ref 7–23)
CALCIUM SERPL-MCNC: 10.1 MG/DL — SIGNIFICANT CHANGE UP (ref 8.4–10.5)
CALCIUM SERPL-MCNC: 9.8 MG/DL — SIGNIFICANT CHANGE UP (ref 8.4–10.5)
CHLORIDE SERPL-SCNC: 102 MMOL/L — SIGNIFICANT CHANGE UP (ref 96–108)
CHLORIDE SERPL-SCNC: 105 MMOL/L — SIGNIFICANT CHANGE UP (ref 96–108)
CO2 SERPL-SCNC: 17 MMOL/L — LOW (ref 22–31)
CO2 SERPL-SCNC: 21 MMOL/L — LOW (ref 22–31)
CREAT SERPL-MCNC: 0.77 MG/DL — SIGNIFICANT CHANGE UP (ref 0.5–1.3)
CREAT SERPL-MCNC: 0.83 MG/DL — SIGNIFICANT CHANGE UP (ref 0.5–1.3)
EGFR: 86 ML/MIN/1.73M2 — SIGNIFICANT CHANGE UP
EGFR: 86 ML/MIN/1.73M2 — SIGNIFICANT CHANGE UP
EGFR: 94 ML/MIN/1.73M2 — SIGNIFICANT CHANGE UP
EGFR: 94 ML/MIN/1.73M2 — SIGNIFICANT CHANGE UP
EOSINOPHIL # BLD AUTO: 0.15 K/UL — SIGNIFICANT CHANGE UP (ref 0–0.5)
EOSINOPHIL NFR BLD AUTO: 2.2 % — SIGNIFICANT CHANGE UP (ref 0–6)
GLUCOSE SERPL-MCNC: 102 MG/DL — HIGH (ref 70–99)
GLUCOSE SERPL-MCNC: 95 MG/DL — SIGNIFICANT CHANGE UP (ref 70–99)
HCG SERPL-ACNC: <2 MIU/ML — SIGNIFICANT CHANGE UP
HCT VFR BLD CALC: 41.3 % — SIGNIFICANT CHANGE UP (ref 34.5–45)
HGB BLD-MCNC: 13.8 G/DL — SIGNIFICANT CHANGE UP (ref 11.5–15.5)
IMM GRANULOCYTES # BLD AUTO: 0.01 K/UL — SIGNIFICANT CHANGE UP (ref 0–0.07)
IMM GRANULOCYTES NFR BLD AUTO: 0.1 % — SIGNIFICANT CHANGE UP (ref 0–0.9)
LYMPHOCYTES # BLD AUTO: 2.69 K/UL — SIGNIFICANT CHANGE UP (ref 1–3.3)
LYMPHOCYTES NFR BLD AUTO: 38.6 % — SIGNIFICANT CHANGE UP (ref 13–44)
MAGNESIUM SERPL-MCNC: 2.4 MG/DL — SIGNIFICANT CHANGE UP (ref 1.6–2.6)
MCHC RBC-ENTMCNC: 31.9 PG — SIGNIFICANT CHANGE UP (ref 27–34)
MCHC RBC-ENTMCNC: 33.4 G/DL — SIGNIFICANT CHANGE UP (ref 32–36)
MCV RBC AUTO: 95.6 FL — SIGNIFICANT CHANGE UP (ref 80–100)
MONOCYTES # BLD AUTO: 0.6 K/UL — SIGNIFICANT CHANGE UP (ref 0–0.9)
MONOCYTES NFR BLD AUTO: 8.6 % — SIGNIFICANT CHANGE UP (ref 2–14)
NEUTROPHILS # BLD AUTO: 3.48 K/UL — SIGNIFICANT CHANGE UP (ref 1.8–7.4)
NEUTROPHILS NFR BLD AUTO: 50.1 % — SIGNIFICANT CHANGE UP (ref 43–77)
NRBC # BLD AUTO: 0 K/UL — SIGNIFICANT CHANGE UP (ref 0–0)
NRBC # FLD: 0 K/UL — SIGNIFICANT CHANGE UP (ref 0–0)
NRBC BLD AUTO-RTO: 0 /100 WBCS — SIGNIFICANT CHANGE UP (ref 0–0)
NT-PROBNP SERPL-SCNC: 91 PG/ML — SIGNIFICANT CHANGE UP (ref 0–300)
PLATELET # BLD AUTO: 225 K/UL — SIGNIFICANT CHANGE UP (ref 150–400)
PMV BLD: 11.5 FL — SIGNIFICANT CHANGE UP (ref 7–13)
POTASSIUM SERPL-MCNC: 4 MMOL/L — SIGNIFICANT CHANGE UP (ref 3.5–5.3)
POTASSIUM SERPL-MCNC: 5.5 MMOL/L — HIGH (ref 3.5–5.3)
POTASSIUM SERPL-SCNC: 4 MMOL/L — SIGNIFICANT CHANGE UP (ref 3.5–5.3)
POTASSIUM SERPL-SCNC: 5.5 MMOL/L — HIGH (ref 3.5–5.3)
PROT SERPL-MCNC: 7.4 G/DL — SIGNIFICANT CHANGE UP (ref 6–8.3)
PROT SERPL-MCNC: 8.1 G/DL — SIGNIFICANT CHANGE UP (ref 6–8.3)
RBC # BLD: 4.32 M/UL — SIGNIFICANT CHANGE UP (ref 3.8–5.2)
RBC # FLD: 12.3 % — SIGNIFICANT CHANGE UP (ref 10.3–14.5)
SODIUM SERPL-SCNC: 136 MMOL/L — SIGNIFICANT CHANGE UP (ref 135–145)
SODIUM SERPL-SCNC: 138 MMOL/L — SIGNIFICANT CHANGE UP (ref 135–145)
TROPONIN T, HIGH SENSITIVITY RESULT: 8 NG/L — SIGNIFICANT CHANGE UP (ref 0–51)
TROPONIN T, HIGH SENSITIVITY RESULT: <6 NG/L — SIGNIFICANT CHANGE UP (ref 0–51)
WBC # BLD: 6.96 K/UL — SIGNIFICANT CHANGE UP (ref 3.8–10.5)
WBC # FLD AUTO: 6.96 K/UL — SIGNIFICANT CHANGE UP (ref 3.8–10.5)

## 2025-07-30 PROCEDURE — 83880 ASSAY OF NATRIURETIC PEPTIDE: CPT

## 2025-07-30 PROCEDURE — 84702 CHORIONIC GONADOTROPIN TEST: CPT

## 2025-07-30 PROCEDURE — 99285 EMERGENCY DEPT VISIT HI MDM: CPT

## 2025-07-30 PROCEDURE — 85025 COMPLETE CBC W/AUTO DIFF WBC: CPT

## 2025-07-30 PROCEDURE — 36415 COLL VENOUS BLD VENIPUNCTURE: CPT

## 2025-07-30 PROCEDURE — 71046 X-RAY EXAM CHEST 2 VIEWS: CPT | Mod: 26

## 2025-07-30 PROCEDURE — 93010 ELECTROCARDIOGRAM REPORT: CPT

## 2025-07-30 PROCEDURE — 84484 ASSAY OF TROPONIN QUANT: CPT

## 2025-07-30 PROCEDURE — 83735 ASSAY OF MAGNESIUM: CPT

## 2025-07-30 PROCEDURE — 93005 ELECTROCARDIOGRAM TRACING: CPT

## 2025-07-30 PROCEDURE — 80053 COMPREHEN METABOLIC PANEL: CPT

## 2025-07-30 PROCEDURE — 99285 EMERGENCY DEPT VISIT HI MDM: CPT | Mod: 25

## 2025-07-30 PROCEDURE — 71046 X-RAY EXAM CHEST 2 VIEWS: CPT
